# Patient Record
Sex: FEMALE | Race: WHITE | Employment: OTHER | URBAN - METROPOLITAN AREA
[De-identification: names, ages, dates, MRNs, and addresses within clinical notes are randomized per-mention and may not be internally consistent; named-entity substitution may affect disease eponyms.]

---

## 2017-01-03 ENCOUNTER — HOSPITAL ENCOUNTER (OUTPATIENT)
Dept: LAB | Age: 77
Discharge: HOME | End: 2017-01-03
Payer: MEDICARE

## 2017-01-03 ENCOUNTER — OFFICE VISIT (OUTPATIENT)
Dept: FAMILY MEDICINE CLINIC | Age: 77
End: 2017-01-03

## 2017-01-03 VITALS
DIASTOLIC BLOOD PRESSURE: 62 MMHG | RESPIRATION RATE: 18 BRPM | SYSTOLIC BLOOD PRESSURE: 126 MMHG | BODY MASS INDEX: 18.83 KG/M2 | WEIGHT: 113 LBS | TEMPERATURE: 97.5 F | OXYGEN SATURATION: 95 % | HEIGHT: 65 IN | HEART RATE: 58 BPM

## 2017-01-03 DIAGNOSIS — Z23 ENCOUNTER FOR IMMUNIZATION: ICD-10-CM

## 2017-01-03 DIAGNOSIS — R73.01 IMPAIRED FASTING GLUCOSE: Primary | ICD-10-CM

## 2017-01-03 DIAGNOSIS — E78.5 HYPERLIPIDEMIA, UNSPECIFIED HYPERLIPIDEMIA TYPE: ICD-10-CM

## 2017-01-03 DIAGNOSIS — I10 BENIGN ESSENTIAL HYPERTENSION: ICD-10-CM

## 2017-01-03 PROCEDURE — 83036 HEMOGLOBIN GLYCOSYLATED A1C: CPT

## 2017-01-03 PROCEDURE — 36415 COLL VENOUS BLD VENIPUNCTURE: CPT

## 2017-01-03 PROCEDURE — 82947 ASSAY GLUCOSE BLOOD QUANT: CPT

## 2017-01-03 NOTE — PROGRESS NOTES
Chief Complaint   Patient presents with    Hypertension      not fasting ---- follow up    Cholesterol Problem     . 1. Have you been to the ER, urgent care clinic since your last visit? Hospitalized since your last visit? No    2. Have you seen or consulted any other health care providers outside of the [facility] since your last visit? Include any pap smears or colon screening.  No    Stepped on nail on Saturday- 3 days ago - had some work done on her Bathroom and a nail was left-did puncture the foot and was bleeding a moderate amount-states she stepped on it twice

## 2017-01-03 NOTE — PATIENT INSTRUCTIONS
Prediabetes: Care Instructions  Your Care Instructions  Prediabetes is a warning sign that you are at risk for getting type 2 diabetes. It means that your blood sugar is higher than it should be. The food you eat turns into sugar, which your body uses for energy. Normally, an organ called the pancreas makes insulin, which allows the sugar in your blood to get into your body's cells. But when your body can't use insulin the right way, the sugar doesn't move into cells. It stays in your blood instead. This is called insulin resistance. The buildup of sugar in the blood causes prediabetes. The good news is that lifestyle changes may help you get your blood sugar back to normal and help you avoid or delay diabetes. Follow-up care is a key part of your treatment and safety. Be sure to make and go to all appointments, and call your doctor if you are having problems. It's also a good idea to know your test results and keep a list of the medicines you take. How can you care for yourself at home? · Watch your weight. A healthy weight helps your body use insulin properly. · Limit the amount of calories, sweets, and unhealthy fat you eat. Ask your doctor if you should see a dietitian. A registered dietitian can help you create meal plans that fit your lifestyle. · Get at least 30 minutes of exercise on most days of the week. Exercise helps control your blood sugar. It also helps you maintain a healthy weight. Walking is a good choice. You also may want to do other activities, such as running, swimming, cycling, or playing tennis or team sports. · Do not smoke. Smoking can make prediabetes worse. If you need help quitting, talk to your doctor about stop-smoking programs and medicines. These can increase your chances of quitting for good. · If your doctor prescribed medicines, take them exactly as prescribed. Call your doctor if you think you are having a problem with your medicine.  You will get more details on the specific medicines your doctor prescribes. When should you call for help? Watch closely for changes in your health, and be sure to contact your doctor if:  · You have any symptoms of diabetes. These may include:  ¨ Being thirsty more often. ¨ Urinating more. ¨ Being hungrier. ¨ Losing weight. ¨ Being very tired. ¨ Having blurry vision. · You have a wound that will not heal.  · You have an infection that will not go away. · You have problems with your blood pressure. · You want more information about diabetes and how you can keep from getting it. Where can you learn more? Go to http://elizabeth-fab.info/. Enter I222 in the search box to learn more about \"Prediabetes: Care Instructions. \"  Current as of: May 23, 2016  Content Version: 11.1  © 6432-8740 [employer]. Care instructions adapted under license by [employer] (which disclaims liability or warranty for this information). If you have questions about a medical condition or this instruction, always ask your healthcare professional. [address] any warranty or liability for your use of this information.

## 2017-01-03 NOTE — MR AVS SNAPSHOT
Visit Information Date & Time Provider Department Dept. Phone Encounter #  
 1/3/2017 10:00 AM Monica Soares, [address] [phone] [id number] Upcoming Health Maintenance Date Due FOBT Q 1 YEAR AGE 50-75 6/29/2011 MEDICARE YEARLY EXAM 6/25/2017 GLAUCOMA SCREENING Q2Y 3/22/2018 DTaP/Tdap/Td series (2 - Td) 6/24/2026 Allergies as of 1/3/2017  Review Complete On: 1/3/2017 By: Monica Soares MD  
  
 Severity Noted Reaction Type Reactions Sulfa (Sulfonamide Antibiotics)  06/25/2010    Nausea Only Current Immunizations  Reviewed on 1/3/2017 Name Date Influenza High Dose Vaccine PF 1/3/2017 Influenza Vaccine 3/9/2015 Influenza Vaccine Split 12/31/2012 Pneumococcal Polysaccharide (PPSV-23) 3/7/2016 Pneumococcal Vaccine (Unspecified Type) 9/3/2007 TB Skin Test (PPD) Intradermal 7/6/2016 TD Vaccine 6/18/1990 Tdap 6/24/2016 Reviewed by Monica Soares MD on 1/3/2017 at 10:38 AM  
You Were Diagnosed With   
  
 Codes Comments Impaired fasting glucose    -  Primary ICD-10-CM: R73.01 
ICD-9-CM: 790.21 Hyperlipidemia, unspecified hyperlipidemia type     ICD-10-CM: E78.5 ICD-9-CM: 272.4 Benign essential hypertension     ICD-10-CM: I10 
ICD-9-CM: 401.1 Encounter for immunization     ICD-10-CM: I85 ICD-9-CM: V03.89 Vitals BP Pulse Temp Resp Height(growth percentile) Weight(growth percentile) 126/62 (BP 1 Location: Left arm, BP Patient Position: Sitting) (!) 58 97.5 °F (36.4 °C) (Oral) 18 5' 5\" (1.651 m) 113 lb (51.3 kg) LMP SpO2 BMI OB Status Smoking Status (LMP Unknown) 95% 18.8 kg/m2 Postmenopausal Current Every Day Smoker Vitals History BMI and BSA Data Body Mass Index Body Surface Area  
 18.8 kg/m 2 1.53 m 2 Preferred Pharmacy Pharmacy Name Phone  [id number] [address] [phone] Your Updated Medication List  
  
   
This list is accurate as of: 1/3/17 10:54 AM.  Always use your most recent med list.  
  
  
  
  
 aspirin 325 mg tablet Commonly known as:  ASPIRIN Take 325 mg by mouth daily. atorvastatin 10 mg tablet Commonly known as:  LIPITOR  
TAKE 1 TABLET BY MOUTH EVERY DAY  
  
 buprenorphine 5 mcg/hour patch Commonly known as:  BUTRANS  
1 Patch by TransDERmal route every seven (7) days. CALTRATE 600+D PLUS MINERALS 600 mg (1,500 mg)-400 unit Chew Generic drug:  Calcium Carbonate-Vit D3-Min Take  by mouth two (2) times a day. CENTRUM PO Take 1 Tab by mouth daily. FISH OIL PO Take 1 Cap by mouth two (2) times a day. lisinopril 40 mg tablet Commonly known as:  PRINIVIL, ZESTRIL  
TAKE 1 TABLET BY MOUTH EVERY DAY  
  
 meloxicam 15 mg tablet Commonly known as:  MOBIC  
TAKE 1 TABLET BY MOUTH DAILY WITH BREAKFAST  
  
 metoprolol tartrate 50 mg tablet Commonly known as:  LOPRESSOR  
TAKE 1 TABLET BY MOUTH TWICE A DAY PRESERVISION PO Take 1 Tab by mouth two (2) times a day. traMADol 50 mg tablet Commonly known as:  ULTRAM  
Take 50 mg by mouth daily as needed for Pain. Per Neuro Dr Tona Levi  
  
 venlafaxine- mg capsule Commonly known as:  EFFEXOR-XR  
TAKE ONE CAPSULE BY MOUTH EVERY DAY Patient Instructions Prediabetes: Care Instructions Your Care Instructions Prediabetes is a warning sign that you are at risk for getting type 2 diabetes. It means that your blood sugar is higher than it should be. The food you eat turns into sugar, which your body uses for energy. Normally, an organ called the pancreas makes insulin, which allows the sugar in your blood to get into your body's cells. But when your body can't use insulin the right way, the sugar doesn't move into cells.  It stays in your blood instead. This is called insulin resistance. The buildup of sugar in the blood causes prediabetes. The good news is that lifestyle changes may help you get your blood sugar back to normal and help you avoid or delay diabetes. Follow-up care is a key part of your treatment and safety. Be sure to make and go to all appointments, and call your doctor if you are having problems. It's also a good idea to know your test results and keep a list of the medicines you take. How can you care for yourself at home? · Watch your weight. A healthy weight helps your body use insulin properly. · Limit the amount of calories, sweets, and unhealthy fat you eat. Ask your doctor if you should see a dietitian. A registered dietitian can help you create meal plans that fit your lifestyle. · Get at least 30 minutes of exercise on most days of the week. Exercise helps control your blood sugar. It also helps you maintain a healthy weight. Walking is a good choice. You also may want to do other activities, such as running, swimming, cycling, or playing tennis or team sports. · Do not smoke. Smoking can make prediabetes worse. If you need help quitting, talk to your doctor about stop-smoking programs and medicines. These can increase your chances of quitting for good. · If your doctor prescribed medicines, take them exactly as prescribed. Call your doctor if you think you are having a problem with your medicine. You will get more details on the specific medicines your doctor prescribes. When should you call for help? Watch closely for changes in your health, and be sure to contact your doctor if: 
· You have any symptoms of diabetes. These may include: ¨ Being thirsty more often. ¨ Urinating more. ¨ Being hungrier. ¨ Losing weight. ¨ Being very tired. ¨ Having blurry vision. · You have a wound that will not heal. 
· You have an infection that will not go away. · You have problems with your blood pressure. · You want more information about diabetes and how you can keep from getting it. Where can you learn more? Go to http://elizabeth-fab.info/. Enter I222 in the search box to learn more about \"Prediabetes: Care Instructions. \" Current as of: May 23, 2016 Content Version: 11.1 © 7046-8909 [employer]. Care instructions adapted under license by [employer] (which disclaims liability or warranty for this information). If you have questions about a medical condition or this instruction, always ask your healthcare professional. [address] any warranty or liability for your use of this information. Introducing [facility]! Rosalie Romo introduces Slice patient portal. Now you can access parts of your medical record, email your doctor's office, and request medication refills online. 1. In your internet browser, go to [email or web address] 2. Click on the First Time User? Click Here link in the Sign In box. You will see the New Member Sign Up page. 3. Enter your Slice Access Code exactly as it appears below. You will not need to use this code after youve completed the sign-up process. If you do not sign up before the expiration date, you must request a new code. · Slice Access Code: [email or web address] Expires: 3/1/2017 10:14 AM 
 
4. Enter the last four digits of your Social Security Number (xxxx) and Date of Birth (mm/dd/yyyy) as indicated and click Submit. You will be taken to the next sign-up page. 5. Create a MiniBanda.rut ID. This will be your Slice login ID and cannot be changed, so think of one that is secure and easy to remember. 6. Create a Slice password. You can change your password at any time. 7. Enter your Password Reset Question and Answer. This can be used at a later time if you forget your password. 8. Enter your e-mail address.  You will receive e-mail notification when new information is available in Optimal Blue. 9. Click Sign Up. You can now view and download portions of your medical record. 10. Click the Download Summary menu link to download a portable copy of your medical information. If you have questions, please visit the Frequently Asked Questions section of the Optimal Blue website. Remember, Optimal Blue is NOT to be used for urgent needs. For medical emergencies, dial 911. Now available from your iPhone and Android! Please provide this summary of care documentation to your next provider. Your primary care clinician is listed as AXEL WALSH. If you have any questions after today's visit, please call [phone].

## 2017-01-03 NOTE — PROGRESS NOTES
HISTORY OF PRESENT ILLNESS  Lenise Shone is a 78 y.o. female. HPI  6 month follow up prediabetes, HTN, medication check. Not fasting today but all her other labs were normal in 6-2016 (copied below). Her fasting BS that day was 116. Her lipids were excellent. Her BP's have remained good and stable. Generally she has been getting along well and feeling well. Has not had flu shot yet this year. Was wondering about last tetanus shot bc she stepped on a nail in her house 3 days ago. It bled but stopped w/ applying pressure. She cleaned it and has been wearing a bandaid ever since. Heeling up well. No pain. No oozing. No signs of infection. I checked chart and she did have a Tdap at [facility] in 6-2016. Review of Systems   Constitutional: Negative. Negative for chills and fever. Respiratory: Negative. Doing well, sees Pulm Dr Ute Nichols q 6months   Cardiovascular: Negative. Gastrointestinal: Negative. Genitourinary: Negative. Musculoskeletal: Negative for myalgias. Neurological: Negative for dizziness. Endo/Heme/Allergies: Negative. Problem List  Date Reviewed: 1/3/2017          Codes Class Noted    ACP (advance care planning) ICD-10-CM: Z71.89  ICD-9-CM: V65.49  6/24/2016    Overview Signed 6/24/2016  8:33 AM by Madeline Delaney MD     Initial ACP discussion.  Pt states she has an AMD               Benign essential hypertension ICD-10-CM: I10  ICD-9-CM: 401.1  6/24/2016    Overview Signed 6/24/2016  8:34 AM by Madeline Delaney MD     Normal 2D echo 1996  Holter in 2008, occasional PAC's/PVC's             Chronic [facility] pulmonary disease ([facility] ICD-10-CM: J44.9  ICD-9-CM: 496  6/24/2016    Overview Signed 6/24/2016  8:42 PM by Madeline Delaney MD     On CXR 6-2016             Mammogram declined ICD-10-CM: Z53.20  ICD-9-CM: V64.2  12/31/2012    Overview Signed 12/31/2012  9:21 AM by Madeline Delaney MD     Pt refuses             FHx: AAA ICD-10-CM: [id number]  ICD-9-CM: Sharee Danielson  12/31/2012    Overview Signed 12/31/2012  2:19 PM by Juan Ochoa MD     Patient had AAA US screen 9/2007, no aneurysm but mild atherosclerotic plaque             Cervical myelopathy ICD-10-CM: G95.9  ICD-9-CM: 721.1  3/26/2012    Overview Signed 3/26/2012 11:50 AM by Juan Ochoa MD     Neuro Dr Moses Lewis 11/2011             B Foot Neuropathy  ICD-10-CM: G57.90  ICD-9-CM: 355.8  9/22/2011    Overview Signed 9/22/2011  2:15 PM by Juan Ochoa MD     Injections per Podiatry Dr Ashley Toney ~ 2008; orthotics             Idiopathic peripheral neuropathy ICD-10-CM: G60.9  ICD-9-CM: 356.9  9/22/2011    Overview Signed 9/22/2011  2:19 PM by Juan Ochoa MD     Neuro Eval Dr Anat Cabral, [facility] Dr Lidia Anne             H/O Gait Ataxia ICD-10-CM: R27.0  ICD-9-CM: 781.3  9/22/2011    Overview Signed 9/22/2011  2:20 PM by Juan Ochoa MD     S/P Neuro eval; prob r/t etoh and peripheral neuropathy             Tobacco abuse ICD-10-CM: Z72.0  ICD-9-CM: 305.1  9/22/2011        ?  of History of heavy alcohol use ICD-10-CM: P36.125  ICD-9-CM: 305.03  9/22/2011    Overview Signed 9/22/2011  2:27 PM by Juan Ochoa MD     ~8650'H             H/O Idiopathic Microscopic Hematuria ICD-10-CM: R31.29  ICD-9-CM: 599.72  9/22/2011    Overview Signed 9/22/2011  3:11 PM by Juan Ochoa MD     Cystoscopy and eval per Urology Dr Laurita Shone 2008             H/O Left Breast Calcifications ICD-10-CM: R92.1  ICD-9-CM: 793.89  9/22/2011    Overview Signed 9/22/2011  3:14 PM by Juan Ochoa MD     1997, declined bx per Dr George Massey             S/P Left Humeral Fracture 2005 ICD-10-CM: L68.440F  ICD-9-CM: 812.20  9/22/2011    Overview Signed 9/22/2011  3:15 PM by MD Allan Billingsley, reset; Dr Antoine Stiles             DDD (degenerative disc disease), cervical ICD-10-CM: M50.30  ICD-9-CM: 722.4  9/22/2011        DDD (degenerative disc disease), lumbar ICD-10-CM: M51.36  ICD-9-CM: 722.52  9/22/2011    Overview Signed 9/22/2011  3:15 PM by Peter Rasheed MD     PT at [facility] 2009             Left sided sciatica ICD-10-CM: M54.32  ICD-9-CM: 724.3  9/22/2011    Overview Signed 9/22/2011  3:16 PM by Peter Rasheed MD     PT             Left Cervical Radiculopathy, Cervical DDD, Fusion and Facet Arthropathy ICD-10-CM: M54.12  ICD-9-CM: 723.4  9/22/2011    Overview Signed 9/22/2011  3:17 PM by Peter Rasheed MD     PT, 2009             Hyperlipidemia ICD-10-CM: E78.5  ICD-9-CM: 272.4  6/25/2010        Postmenopause, LMP 52yo, No HRT ICD-10-CM: Z78.0  ICD-9-CM: V49.81  6/25/2010        Palpitations ICD-10-CM: R00.2  ICD-9-CM: 785.1  3/16/2010    Overview Signed 3/16/2010  2:05 PM by Miesha Howard     ER [id number]             Bilateral cataracts ICD-10-CM: H26.9  ICD-9-CM: 366.9  3/16/2010        Macular degeneration ICD-10-CM: H35.30  ICD-9-CM: 362.50  3/16/2010    Overview Signed 9/22/2011  2:01 PM by MD Dr John Talavera             Right hip pain ICD-10-CM: M25.551  ICD-9-CM: 719.45  3/16/2010    Overview Addendum 9/22/2011  3:12 PM by Peter Rasheed MD     Sacroiliac pain; Ortho Dr Kenan Gay             OA (osteoarthritis) ICD-10-CM: M19.90  ICD-9-CM: 715.90  3/16/2010    Overview Addendum 9/22/2011  3:12 PM by Peter Rasheed MD     Knee/hip; Dr Lucio Cedillo (PMR)             H/O B Lower Extremity Pain & Weakness, R>L ICD-10-CM: M79.606  ICD-9-CM: 729.5  3/16/2010    Overview Addendum 9/22/2011  3:13 PM by Peter Rasheed MD     PMR, Dr Lucio Cedillo and Neuro eval                 Past Surgical History   Procedure Laterality Date    Hx hip replacement  4/2010     left hip; Dr Ramandeep Thompson Hx orthopaedic  9/2005     no surgery, but had left shoulder/humeral fx after fall-    Hx gyn  age 25     EAB    Hx colonoscopy  ~2009       Current Outpatient Prescriptions   Medication Sig    atorvastatin (LIPITOR) 10 mg tablet TAKE 1 TABLET BY MOUTH EVERY DAY    lisinopril (PRINIVIL, ZESTRIL) 40 mg tablet TAKE 1 TABLET BY MOUTH EVERY DAY    metoprolol tartrate (LOPRESSOR) 50 mg tablet TAKE 1 TABLET BY MOUTH TWICE A DAY    buprenorphine (BUTRANS) 5 mcg/hour patch 1 Patch by TransDERmal route every seven (7) days.  meloxicam (MOBIC) 15 mg tablet TAKE 1 TABLET BY MOUTH DAILY WITH BREAKFAST    traMADol (ULTRAM) 50 mg tablet Take 50 mg by mouth daily as needed for Pain. Per Neuro Dr Shante Sawyer venlafaxine-[facility] 150 mg capsule TAKE ONE CAPSULE BY MOUTH EVERY DAY    DOCOSAHEXANOIC ACID/EPA (FISH OIL PO) Take 1 Cap by mouth two (2) times a day.  Calcium Carbonate-Vit D3-Min (CALTRATE 600+D PLUS MINERALS) 600 mg (1,500 mg)-400 unit Chew Take  by mouth two (2) times a day.  VIT C/DL-E AC/LUT/COPPER/ZNOX (PRESERVISION PO) Take 1 Tab by mouth two (2) times a day.  aspirin (ASPIRIN) 325 mg tablet Take 325 mg by mouth daily.  MULTIVITS W-FE,OTHER MIN (CENTRUM PO) Take 1 Tab by mouth daily. No current facility-administered medications for this visit.       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      Retired     Social History Main Topics    Smoking status: Current Every Day Smoker     Packs/day: 1.00     Years: 40.00     Types: Cigarettes    Smokeless tobacco: Never Used    Alcohol use 0.0 oz/week     0 Standard drinks or equivalent per week      Comment: 1 glass of red wine qhs    Drug use: No    Sexual activity: No     Other Topics Concern     Service No    Blood Transfusions No    Caffeine Concern No     2-3 cups of decaff coffee a day    Occupational Exposure No    Hobby Hazards No    Sleep Concern No    Stress Concern No    Weight Concern No     stable; her usual wts in the 110's-120's     Special Diet No     pretty well balanced diet    Back Care No    Exercise No     stays active, yardwork, gardening    Bike Helmet No    Seat Belt Yes    Self-Exams Yes     Social History Narrative     since 3/2011,  was Bertha Serra,  of lung cancer. Lives at home alone now. Still does all own ADL's, drives, no asst devices to walk. Immunization History   Administered Date(s) Administered    Influenza Vaccine 2015    Influenza Vaccine Split 2012    Pneumococcal Polysaccharide (PPSV-23) 2016    Pneumococcal Vaccine (Unspecified Type) 2007    TB Skin Test (PPD) Intradermal 2016    TD Vaccine 1990    Tdap 2016       Family History   Problem Relation Age of Onset    Cancer Father      skin    Heart Attack Father       in his late [de-identified]    Hypertension Father    [facility] Arthritis-osteo Father     Other Father      AAA; pt had negative screen     Stroke Mother       in her late 66's    Hypertension Mother    [facility] Arthritis-osteo Mother     Diabetes Mother     Cancer Sister      breast and lung    Hypertension Sister     Dementia Sister      Alzheimer[place]     Visit Vitals    /62 (BP 1 Location: Left arm, BP Patient Position: Sitting)    Pulse (!) 58    Temp 97.5 °F (36.4 °C) (Oral)    Resp 18    Ht 5' 5\" (1.651 m)    Wt 113 lb (51.3 kg)    LMP  (LMP Unknown)    SpO2 95%    BMI 18.8 kg/m2       Physical Exam   Constitutional: No distress. Cardiovascular: Normal rate, regular rhythm and normal heart sounds. No murmur heard. Pulmonary/Chest: Effort normal and breath sounds normal. No respiratory distress. Musculoskeletal: She exhibits no edema or tenderness. Skin:   Tiny puncture wound plantar aspect left foot laterally. Skin clean, dry. No oozing. No signs of infection. Non tender.     Vitals reviewed. Component      Latest Ref Rng & Units 6/24/2016 6/24/2016 6/24/2016 6/24/2016           9:04 AM  9:04 AM  9:04 AM  9:04 AM   Glucose      65 - 99 mg/dL  116 (H)     BUN      8 - 27 mg/dL  17     Creatinine      0.57 - 1.00 mg/dL  0.77     GFR est non-AA      >59 mL/min/1.73  76     GFR est AA      >59 mL/min/1.73  88     BUN/Creatinine ratio      11 - 26  22     Sodium      134 - 144 mmol/L  143     Potassium      3.5 - 5.2 mmol/L  4.4     Chloride      97 - 108 mmol/L  101     CO2      18 - 29 mmol/L  27     Calcium      8.7 - 10.3 mg/dL  9.9     Protein, total      6.0 - 8.5 g/dL  7.1     Albumin      3.5 - 4.8 g/dL  4.4     GLOBULIN, TOTAL      1.5 - 4.5 g/dL  2.7     A-G Ratio      1.1 - 2.5  1.6     Bilirubin, total      0.0 - 1.2 mg/dL  0.5     Alk. phosphatase      39 - 117 IU/L  96     AST      0 - 40 IU/L  29     ALT      0 - 32 IU/L  20     WBC      3.4 - 10.8 x10E3/uL    7.3   RBC      3.77 - 5.28 x10E6/uL    4.37   HGB      11.1 - 15.9 g/dL    13.7   HCT      34.0 - 46.6 %    41.1   MCV      79 - 97 fL    94   MCH      26.6 - 33.0 pg    31.4   MCHC      31.5 - 35.7 g/dL    33.3   RDW      12.3 - 15.4 %    13.5   PLATELET      586 - 879 x10E3/uL    201   Cholesterol, total      100 - 199 mg/dL   168    Triglyceride      0 - 149 mg/dL   106    HDL Cholesterol      >39 mg/dL   69    VLDL, calculated      5 - 40 mg/dL   21    LDL, calculated      0 - 99 mg/dL   78    TSH      0.450 - 4.500 uIU/mL 2.090          ASSESSMENT and PLAN    ICD-10-CM ICD-9-CM    1. Impaired fasting glucose R73.01 790.21 HEMOGLOBIN A1C WITH EAG      GLUCOSE, RANDOM   2. Hyperlipidemia, controlled, stable on Lipitor E78.5 272.4    3. Benign essential hypertension, controlled, stable I10 401.1      Fasting lab results from 6-2016 and schedule of future lab studies reviewed with patient  Non fasting today.  Only checking her BS and HgBA1c today  Reviewed diet, nutrition, exercise activity  Cardiovascular risk and specific lipid/LDL/BS/HgBA1c/BP goals reviewed  Reviewed medications and side effects   Doing well on current regimen  Further follow up & other recommendations pending review of labs as well  If all remains good and stable, RTC for routine fasting follow up 6months, sooner prn

## 2017-01-04 LAB
EST. AVERAGE GLUCOSE BLD GHB EST-MCNC: 120 MG/DL
GLUCOSE SERPL-MCNC: 95 MG/DL (ref 65–99)
HBA1C MFR BLD: 5.8 % (ref 4.8–5.6)

## 2017-01-13 NOTE — PROGRESS NOTES
Non fasting BS and HgBA1c overall good. No changes at this time. Fasting follow up 6months. Set now, morning appt, advise to fast next time.

## 2017-01-13 NOTE — PROGRESS NOTES
Verified   Informed pt of recent lab results and set up appointment for 2017 for 10am (6m fasting follow up visit)

## 2017-02-08 ENCOUNTER — TELEPHONE (OUTPATIENT)
Dept: FAMILY MEDICINE CLINIC | Age: 77
End: 2017-02-08

## 2017-02-08 NOTE — TELEPHONE ENCOUNTER
PC states she is getting auto calls for bone density. Asked her to disregard those calls, we will discuss at next OV.

## 2017-03-09 ENCOUNTER — HOSPITAL ENCOUNTER (OUTPATIENT)
Dept: VASCULAR SURGERY | Age: 77
Discharge: HOME | End: 2017-03-09
Attending: PSYCHIATRY & NEUROLOGY
Payer: MEDICARE

## 2017-03-09 DIAGNOSIS — M79.89 SWELLING OF LIMB: ICD-10-CM

## 2017-03-09 PROCEDURE — 93970 EXTREMITY STUDY: CPT

## 2017-03-09 NOTE — PROCEDURES
[facility]  *** FINAL REPORT ***    Name: Concepcion Piña  MRN: [id number]    Outpatient  : 01 Mar 1940  HIS Order #: [id number]  [id number] [facility] Visit #: [id number]  Date: 09 Mar 2017    TYPE OF TEST: Peripheral Venous Testing    REASON FOR TEST  Limb swelling    Right Leg:-  Deep venous thrombosis:           No  Superficial venous thrombosis:    No  Deep venous insufficiency:        Not examined  Superficial venous insufficiency: Not examined    Left Leg:-  Deep venous thrombosis:           No  Superficial venous thrombosis:    No  Deep venous insufficiency:        Not examined  Superficial venous insufficiency: Not examined      INTERPRETATION/FINDINGS  PROCEDURE:  Color duplex ultrasound imaging of lower extremity veins. FINDINGS:       Right: The common femoral, deep femoral, femoral, popliteal,  posterior tibial, peroneal, and great saphenous are patent and without   evidence of thrombus;  each is fully compressible and there is no  narrowing of the flow channel on color Doppler imaging. Phasic flow  is observed in the common femoral vein. Left:   The common femoral, deep femoral, femoral, popliteal,  posterior tibial, peroneal, and great saphenous are patent and without   evidence of thrombus;  each is fully compressible and there is no  narrowing of the flow channel on color Doppler imaging. Phasic flow  is observed in the common femoral vein. IMPRESSION:  No evidence of right or left lower extremity vein  thrombosis. ADDITIONAL COMMENTS    I have personally reviewed the data relevant to the interpretation of  this  study. TECHNOLOGIST: Ariadna Clark.  Perez Paris  Signed: 2017 10:10 AM    PHYSICIAN: Chris Rubio MD  Signed: 03/10/2017 06:35 AM

## 2017-06-12 ENCOUNTER — HOSPITAL ENCOUNTER (OUTPATIENT)
Dept: CT IMAGING | Age: 77
Discharge: HOME | End: 2017-06-12
Attending: INTERNAL MEDICINE
Payer: MEDICARE

## 2017-06-12 DIAGNOSIS — R93.89 ABNORMAL CHEST CT: ICD-10-CM

## 2017-06-12 PROCEDURE — 71250 CT THORAX DX C-: CPT

## 2017-07-03 ENCOUNTER — HOSPITAL ENCOUNTER (OUTPATIENT)
Dept: PET IMAGING | Age: 77
Discharge: HOME | End: 2017-07-03
Attending: INTERNAL MEDICINE
Payer: MEDICARE

## 2017-07-03 VITALS — BODY MASS INDEX: 17.04 KG/M2 | HEIGHT: 66 IN | WEIGHT: 106 LBS

## 2017-07-03 DIAGNOSIS — R91.1 LUNG NODULE: ICD-10-CM

## 2017-07-03 PROCEDURE — 78815 PET IMAGE W/CT SKULL-THIGH: CPT

## 2017-07-03 RX ORDER — SODIUM CHLORIDE 0.9 % (FLUSH) 0.9 %
10 SYRINGE (ML) INJECTION
Status: COMPLETED | OUTPATIENT
Start: 2017-07-03 | End: 2017-07-03

## 2017-07-03 RX ADMIN — Medication 10 ML: at 10:54

## 2017-07-14 ENCOUNTER — HOSPITAL ENCOUNTER (OUTPATIENT)
Dept: LAB | Age: 77
Discharge: HOME | End: 2017-07-14
Payer: MEDICARE

## 2017-07-14 ENCOUNTER — OFFICE VISIT (OUTPATIENT)
Dept: FAMILY MEDICINE CLINIC | Age: 77
End: 2017-07-14

## 2017-07-14 VITALS
OXYGEN SATURATION: 96 % | DIASTOLIC BLOOD PRESSURE: 68 MMHG | TEMPERATURE: 97.9 F | SYSTOLIC BLOOD PRESSURE: 128 MMHG | HEIGHT: 66 IN | RESPIRATION RATE: 14 BRPM | BODY MASS INDEX: 16.49 KG/M2 | HEART RATE: 76 BPM | WEIGHT: 102.6 LBS

## 2017-07-14 DIAGNOSIS — I10 BENIGN ESSENTIAL HYPERTENSION: ICD-10-CM

## 2017-07-14 DIAGNOSIS — Z00.00 ROUTINE GENERAL MEDICAL EXAMINATION AT A HEALTH CARE FACILITY: Primary | ICD-10-CM

## 2017-07-14 DIAGNOSIS — E78.5 HYPERLIPIDEMIA, UNSPECIFIED HYPERLIPIDEMIA TYPE: ICD-10-CM

## 2017-07-14 PROBLEM — R26.81 GAIT INSTABILITY: Status: ACTIVE | Noted: 2017-07-14

## 2017-07-14 PROBLEM — R91.8 MULTIPLE PULMONARY NODULES: Status: ACTIVE | Noted: 2017-07-14

## 2017-07-14 PROCEDURE — 84443 ASSAY THYROID STIM HORMONE: CPT

## 2017-07-14 PROCEDURE — 81001 URINALYSIS AUTO W/SCOPE: CPT

## 2017-07-14 PROCEDURE — 85025 COMPLETE CBC W/AUTO DIFF WBC: CPT

## 2017-07-14 PROCEDURE — 80061 LIPID PANEL: CPT

## 2017-07-14 NOTE — PATIENT INSTRUCTIONS
High-Calorie and High-Protein Diet: Care Instructions  Your Care Instructions  A high-calorie, high-protein diet gives you more energy and extra nutrition to help your body heal. Your doctor and dietitian can help you design a diet based on your health and what you prefer to eat. Always talk with your doctor or dietitian before you make changes in your diet. Follow-up care is a key part of your treatment and safety. Be sure to make and go to all appointments, and call your doctor if you are having problems. Its also a good idea to know your test results and keep a list of the medicines you take. How can you care for yourself at home? · Eat three meals a day, plus snacks in between and at bedtime. · Include favorite foods in your meals. This will help make meals more pleasant. · Drink your beverage at the end of the meal, because drinking before or during the meal can fill you up. · Eat high-protein foods, such as:  ¨ Meat, fish, and poultry. ¨ Milk and milk products. Add powdered milk to other foods (such as pudding or soups) to boost the protein. ¨ Eggs. ¨ Cooked dried beans and peas. ¨ Peanut butter, nuts, and seeds. ¨ Tofu. ¨ Cheeses. ¨ Protein bars. · Eat high-calorie foods, such as:  ¨ Butter, honey, and brown sugar, added to foods to make them taste better. ¨ Oils, sauces, and gravies. ¨ Peanut butter. ¨ Whole milk, yogurt, mayonnaise, and sour cream.  ¨ Granola cereal with fruit and granola bars. ¨ Muffins, pancakes, waffles, and other breads. ¨ Milkshakes, puddings, and custard. · Try high-energy drinks, such as Ensure, Boost, or instant breakfast drinks, if you have trouble eating solid foods. They will give you both calories and protein. Soups and smoothies also are good sources of nutrition. · Keep snacks around that are easy to eat, such as pudding, energy bars, ice cream, and flavored ice pops. · If you can, take a walk before you eat, to make you hungrier.   · Do not waste energy eating foods that do not give you much nutrition, such as potato chips, candy bars, and soft drinks. · Drink plenty of fluids. If you have kidney, heart, or liver disease and have to limit fluids, talk with your doctor before you increase the amount of fluids you drink. Where can you learn more? Go to http://elizabeth-fab.info/. Enter U780 in the search box to learn more about \"High-Calorie and High-Protein Diet: Care Instructions. \"  Current as of: July 26, 2016  Content Version: 11.3  © 6406-3927 [employer]. Care instructions adapted under license by [employer] (which disclaims liability or warranty for this information). If you have questions about a medical condition or this instruction, always ask your healthcare professional. [address] any warranty or liability for your use of this information. Advance Directives: Care Instructions  Your Care Instructions  An advance directive is a legal way to state your wishes at the end of your life. It tells your family and your doctor what to do if you can no longer say what you want. There are two main types of advance directives. You can change them any time that your wishes change. · A living will tells your family and your doctor your wishes about life support and other treatment. · A durable power of  for health care lets you name a person to make treatment decisions for you when you can't speak for yourself. This person is called a health care agent. If you do not have an advance directive, decisions about your medical care may be made by a doctor or a  who doesn't know you. It may help to think of an advance directive as a gift to the people who care for you. If you have one, they won't have to make tough decisions by themselves. Follow-up care is a key part of your treatment and safety.  Be sure to make and go to all appointments, and call your doctor if you are having problems. It's also a good idea to know your test results and keep a list of the medicines you take. How can you care for yourself at home? · Discuss your wishes with your loved ones and your doctor. This way, there are no surprises. · Many states have a unique form. Or you might use a universal form that has been approved by many states. This kind of form can sometimes be completed and stored online. Your electronic copy will then be available wherever you have a connection to the Internet. In most cases, doctors will respect your wishes even if you have a form from a different state. · You don't need a  to do an advance directive. But you may want to get legal advice. · Think about these questions when you prepare an advance directive:  ¨ Who do you want to make decisions about your medical care if you are not able to? Many people choose a family member or close friend. ¨ Do you know enough about life support methods that might be used? If not, talk to your doctor so you understand. ¨ What are you most afraid of that might happen? You might be afraid of having pain, losing your independence, or being kept alive by machines. ¨ Where would you prefer to die? Choices include your home, a hospital, or a nursing home. ¨ Would you like to have information about hospice care to support you and your family? ¨ Do you want to donate organs when you die? ¨ Do you want certain Shinto practices performed before you die? If so, put your wishes in the advance directive. · Read your advance directive every year, and make changes as needed. When should you call for help? Be sure to contact your doctor if you have any questions. Where can you learn more? Go to http://elizabeth-fab.info/. Enter R264 in the search box to learn more about \"Advance Directives: Care Instructions. \"  Current as of: November 17, 2016  Content Version: 11.3  © 1291-2542 "[employer] [facility].  Care instructions adapted under license by [employer] (which disclaims liability or warranty for this information). If you have questions about a medical condition or this instruction, always ask your healthcare professional. [address] any warranty or liability for your use of this information. Schedule of Personalized Health Plan  (Provide Copy to Patient)  The best way to stay healthy is to live a healthy lifestyle. A healthy lifestyle includes regular exercise, eating a well-balanced diet, keeping a healthy weight and not smoking. Regular physical exams and screening tests are another important way to take care of yourself. Preventive exams provided by health care providers can find health problems early when treatment works best and can keep you from getting certain diseases or illnesses. Preventive services include exams, lab tests, screenings, shots, monitoring and information to help you take care of your own health. All people over 65 should have a pneumonia shot. Pneumonia shots are usually only needed once in a lifetime unless your doctor decides differently. All people over 65 should have a yearly flu shot. People over 65 are at medium to high risk for Hepatitis B. Three shots are needed for complete protection. In addition to your physical exam, some screening tests are recommended:    Bone mass measurement (dexa scan) is recommended every two years  Diabetes Mellitus screening is recommended every year. Glaucoma is an eye disease caused by high pressure in the eye. An eye exam is recommended every year. Cardiovascular screening tests that check your cholesterol and other blood fat (lipid) levels are recommended every five years. Colorectal Cancer screening tests help to find pre-cancerous polyps (growths in the colon) so they can be removed before they turn into cancer.   Tests ordered for screening depend on your personal and family history risk factors.     Screening for Breast Cancer is recommended yearly with a mammogram.    Screening for Cervical Cancer is recommended every two years (annually for certain risk factors, such as previous history of STD or abnormal PAP in past 7 years), with a Pelvic Exam with PAP    Here is a list of your current Health Maintenance items with a due date:  Health Maintenance   Topic Date Due    INFLUENZA AGE 9 TO ADULT  08/01/2017    GLAUCOMA SCREENING Q2Y  03/22/2018    MEDICARE YEARLY EXAM  07/15/2018    DTaP/Tdap/Td series (2 - Td) 06/24/2026    OSTEOPOROSIS SCREENING (DEXA)  Addressed    ZOSTER VACCINE AGE 60>  Addressed    Pneumococcal 65+ Low/Medium Risk  Completed

## 2017-07-14 NOTE — PROGRESS NOTES
HISTORY OF PRESENT ILLNESS  Chay Olson is a 78 y.o. female. HPI  Fasting follow up hypercholesterolemia, hypertension, labs and medication check. Doing well on current medication regimen. Neuro has her on regimen for neuropathy. She seldom takes the Mobic now a days. Feels the buprenorphine he is perscribing is helping and she reserves Tramadol for nighttime on occasion. BP's good and stable. I notice her wt has been trending down. She admits her appetite is not what it used to be but she still tries to eat what she likes. But she finds she is even getting pickier w/ time. She is also being followed closely by Pulm Dr Dory Morrison, see details below. She is scheduled for follow up chest CT scan and bronchoscopy on 7-25-17. She denies any CP, increased cough, wheeze or SOB. No fevers, chills, sweats. Review of Systems   Constitutional: Negative for chills, diaphoresis and fever. Respiratory: Negative for shortness of breath and wheezing. Cardiovascular: Negative for chest pain and palpitations. Gastrointestinal: Negative for abdominal pain, blood in stool, constipation, diarrhea, nausea and vomiting. Musculoskeletal: Negative for myalgias. Neurological: Negative for headaches. Psychiatric/Behavioral: Negative.       Problem List  Date Reviewed: 7/14/2017          Codes Class Noted    Gait instability ICD-10-CM: R26.81  ICD-9-CM: 781.2  7/14/2017    Overview Signed 7/14/2017 10:34 AM by Gato Tabares MD     Neuro referred to PT at [facility] x 2 months Spring 2017             Multiple pulmonary nodules and cavitary lesions ICD-10-CM: R91.8  ICD-9-CM: 793.19  7/14/2017    Overview Signed 7/14/2017  3:42 PM by Gato Tabares MD     2016: Sourav Sarmiento Marker: CT/Pet scan: Inflammatory vs atypical infectious vs metastatic dz             ACP (advance care planning) ICD-10-CM: Z71.89  ICD-9-CM: V65.49  6/24/2016    Overview Addendum 7/14/2017  3:37 PM by Gato Tabares MD     Initial ACP discussion 2016, pt states she thinks she has an AMD; Honoring Choices folder given 7-2017               Benign essential hypertension ICD-10-CM: I10  ICD-9-CM: 401.1  6/24/2016    Overview Signed 6/24/2016  8:34 AM by Frank López MD     Normal 2D echo 1996  Holter in 2008, occasional PAC's/PVC's             Chronic obstructive pulmonary disease ([facility] [address] ICD-10-CM: J44.9  ICD-9-CM: 496  6/24/2016    Overview Addendum 7/14/2017  3:41 PM by Frank López MD     On CXR 6-2016; Pulm Dr Kaylee Spaulding declined ICD-10-CM: Z53.20  ICD-9-CM: V64.2  12/31/2012    Overview Signed 12/31/2012  9:21 AM by Frank López MD     Pt refuses             FHx: AAA ICD-10-CM: Adela Child  ICD-9-CM: Adela Child  12/31/2012    Overview Signed 12/31/2012  2:19 PM by Frank López MD     Patient had AAA US screen 9/2007, no aneurysm but mild atherosclerotic plaque             Cervical myelopathy ([facility] [address] ICD-10-CM: G95.9  ICD-9-CM: 721.1  3/26/2012    Overview Signed 3/26/2012 11:50 AM by Frank López MD     Neuro Dr Rochelle Olivier 11/2011             B Foot Neuropathy  ICD-10-CM: G57.90  ICD-9-CM: 355.8  9/22/2011    Overview Signed 9/22/2011  2:15 PM by Frank López MD     Injections per Podiatry Dr Brigido Ross ~ 2008; orthotics             Idiopathic peripheral neuropathy ICD-10-CM: G60.9  ICD-9-CM: 356.9  9/22/2011    Overview Signed 9/22/2011  2:19 PM by Frank López MD     Neuro Eval Dr Ana Rosa Rosario, [facility] Dr Mansfield Simmonds             H/O Gait Ataxia ICD-10-CM: R27.0  ICD-9-CM: 781.3  9/22/2011    Overview Signed 9/22/2011  2:20 PM by Frank López MD     S/P Neuro eval; prob r/t etoh and peripheral neuropathy             Tobacco abuse ICD-10-CM: Z72.0  ICD-9-CM: 305.1  9/22/2011        ?  of History of heavy alcohol use ICD-10-CM: Y86.811  ICD-9-CM: 305.03  9/22/2011    Overview Signed 9/22/2011  2:27 PM by Frank López MD     ~2848'X H/O Idiopathic Microscopic Hematuria ICD-10-CM: R31.29  ICD-9-CM: 599.72  9/22/2011    Overview Signed 9/22/2011  3:11 PM by Ann Barahona MD     Cystoscopy and eval per Urology Dr Gloria Painting 2008             H/O Left Breast Calcifications ICD-10-CM: R92.1  ICD-9-CM: 793.89  9/22/2011    Overview Signed 9/22/2011  3:14 PM by Ann Barahona MD     1997, declined bx per Dr Genet Dennis             S/P Left Humeral Fracture 2005 ICD-10-CM: X18.064C  ICD-9-CM: 812.20  9/22/2011    Overview Signed 9/22/2011  3:15 PM by MD Alexia Neff, reset; Amber Coreas, Dr Reyes Comment             DDD (degenerative disc disease), cervical ICD-10-CM: M50.30  ICD-9-CM: 722.4  9/22/2011        DDD (degenerative disc disease), lumbar ICD-10-CM: M51.36  ICD-9-CM: 722.52  9/22/2011    Overview Signed 9/22/2011  3:15 PM by Ann Barahona MD     PT at [address] 2009             Left sided sciatica ICD-10-CM: M54.32  ICD-9-CM: 724.3  9/22/2011    Overview Signed 9/22/2011  3:16 PM by Ann Barahona MD     PT             Left Cervical Radiculopathy, Cervical DDD, Fusion and Facet Arthropathy ICD-10-CM: M54.12  ICD-9-CM: 723.4  9/22/2011    Overview Signed 9/22/2011  3:17 PM by Ann Barahona MD     PT, 2009             Hyperlipidemia ICD-10-CM: E78.5  ICD-9-CM: 272.4  6/25/2010        Postmenopause, LMP 54yo, No HRT ICD-10-CM: Z78.0  ICD-9-CM: V49.81  6/25/2010        Palpitations ICD-10-CM: R00.2  ICD-9-CM: 785.1  3/16/2010    Overview Signed 3/16/2010  2:05 PM by Jami Rizo     ER [phone]             Bilateral cataracts ICD-10-CM: H26.9  ICD-9-CM: 366.9  3/16/2010        Macular degeneration ICD-10-CM: H35.30  ICD-9-CM: 362.50  3/16/2010    Overview Signed 9/22/2011  2:01 PM by MD Dr Anna Neff             Right hip pain ICD-10-CM: M25.551  ICD-9-CM: 719.45  3/16/2010    Overview Addendum 9/22/2011  3:12 PM by Ann Barahona MD     Sacroiliac pain; Ortho  Davin Chambers             OA (osteoarthritis) ICD-10-CM: M19.90  ICD-9-CM: 715.90  3/16/2010    Overview Addendum 2011  3:12 PM by Pankaj Wilkins MD     Knee/hip; Dr Tran Malhotra (PMR)             H/O B Lower Extremity Pain & Weakness, R>L ICD-10-CM: M79.606  ICD-9-CM: 729.5  3/16/2010    Overview Addendum 2011  3:13 PM by Pankaj Wilkins MD     PMR, Dr Tran Malhotra and Neuro eval                 Past Surgical History:   Procedure Laterality Date    HX CATARACT REMOVAL Bilateral     HX COLONOSCOPY  ~    HX GYN  age 25    EAB   Keary Roup HX HIP REPLACEMENT  2010    left hip; Dr Manjula Palomino [address]  2005    no surgery, but had left shoulder/humeral fx after fall-     OB History      Para Term  AB Living    1 0   1     SAB TAB Ectopic Molar Multiple Live Births     1            Obstetric Comments    EAB x 1 age 19yo        Current Outpatient Prescriptions   Medication Sig    meloxicam (MOBIC) 15 mg tablet Take 1 Tab by mouth daily as needed (for arthritis pain). Take with food    metoprolol tartrate (LOPRESSOR) 50 mg tablet TAKE 1 TABLET BY MOUTH TWICE A DAY    atorvastatin (LIPITOR) 10 mg tablet TAKE 1 TABLET BY MOUTH EVERY DAY    lisinopril (PRINIVIL, ZESTRIL) 40 mg tablet TAKE 1 TABLET BY MOUTH EVERY DAY    buprenorphine (BUTRANS) 5 mcg/hour patch 1 Patch by TransDERmal route every seven (7) days.  traMADol (ULTRAM) 50 mg tablet Take 50 mg by mouth daily as needed for Pain. Per Neuro Dr Jeni Jin venlafaxine-SR [facility] 150 mg capsule TAKE ONE CAPSULE BY MOUTH EVERY DAY    DOCOSAHEXANOIC ACID/EPA (FISH OIL PO) Take 1 Cap by mouth two (2) times a day.  Calcium Carbonate-Vit D3-Min (CALTRATE 600+D PLUS MINERALS) 600 mg (1,500 mg)-400 unit Chew Take  by mouth two (2) times a day.  VIT C/DL-E AC/LUT/COPPER/ZNOX (PRESERVISION PO) Take 1 Tab by mouth two (2) times a day.  aspirin (ASPIRIN) 325 mg tablet Take 325 mg by mouth daily.     MULTIVITS W-FE,OTHER MIN (CENTRUM PO) Take 1 Tab by mouth daily. No current facility-administered medications for this visit. Allergies   Allergen Reactions    Sulfa (Sulfonamide Antibiotics) Nausea Only     Social History     Social History    Marital status:      Spouse name: N/A    Number of children: 0    Years of education: N/A     Occupational History    Real Estate, fully retired as of 2012      Retired     Social History Main Topics    Smoking status: Current Every Day Smoker     Packs/day: 1.00     Years: 40.00     Types: Cigarettes    Smokeless tobacco: Never Used    Alcohol use 0.0 oz/week     0 Standard drinks or equivalent per week      Comment: 1 glass of red wine qhs    Drug use: No    Sexual activity: No     Other Topics Concern     Service No    Blood Transfusions No    Caffeine Concern No     2-3 cups of decaff coffee a day    Occupational Exposure No    Hobby Hazards No    Sleep Concern No    Stress Concern No    Weight Concern No    Special Diet No     pretty well balanced diet but admits not eating as much as she used to    Back Care No    Exercise No     stays active, yardwork, gardening    Bike Helmet No    Seat Belt Yes    Self-Exams Yes     Social History Narrative     since 3/2011,  was Denilson Osullivan,  of lung cancer. Lives at home alone now. Still does all own ADL's except has a  come clean once a month, drives, has a walker at home but seldom uses. Does all her own cooking and other chores. Handles her own medications and finances.  Did PT for gait stability  and again spring 2017 x 2 months at [facility] History   Administered Date(s) Administered    Influenza High Dose Vaccine PF 2017    Influenza Vaccine 2015    Influenza Vaccine Split 2012    Pneumococcal Polysaccharide (PPSV-23) 2016    Pneumococcal Vaccine (Unspecified Type) 2007    TB Skin Test (PPD) Intradermal 07/06/2016    TD Vaccine 06/18/1990    Tdap 06/24/2016     Visit Vitals    /68 (BP 1 Location: Left arm, BP Patient Position: Sitting)    Pulse 76    Temp 97.9 °F (36.6 °C) (Oral)    Resp 14    Ht 5' 6\" (1.676 m)    Wt 102 lb 9.6 oz (46.5 kg)    LMP  (LMP Unknown)    SpO2 96%    BMI 16.56 kg/m2     Physical Exam   Constitutional: She is oriented to person, place, and time. No distress. Neck: Carotid bruit is not present. Cardiovascular: Normal rate, regular rhythm and normal heart sounds. Pulmonary/Chest: Effort normal. No respiratory distress. She has no wheezes. She has no rales. Abdominal: Soft. There is no tenderness. Musculoskeletal: She exhibits no edema. Neurological: She is alert and oriented to person, place, and time. Skin: Skin is warm. Psychiatric: She has a normal mood and affect. Her behavior is normal. Thought content normal.   Vitals reviewed. ASSESSMENT and PLAN    ICD-10-CM ICD-9-CM    1. Routine general medical examination at a health care facility Z00.00 V70.0 Medicare AWV, ; Honoring Choices packet given   2. Benign essential hypertension, good and stable I10 401.1 CBC WITH AUTOMATED DIFF      METABOLIC PANEL, COMPREHENSIVE      TSH 3RD GENERATION      URINALYSIS W/ RFLX MICROSCOPIC   3.  Hyperlipidemia, unspecified hyperlipidemia type E78.5 272.4 LIPID PANEL     Fasting labs today  Reviewed diet, nutrition, weight maintenance  I would like her to add Boost or Ensure 1-2 x a day  Cardiovascular risk and specific lipid/LDL goals reviewed  Reviewed medications and side effects; doing well on current regimen at this time  Further follow up & other recommendations pending review of labs as well  Plan on wt check in several weeks

## 2017-07-14 NOTE — PROGRESS NOTES
Lachelle Vang is a 78 y.o. female and presents for annual Medicare Wellness Visit. Problem List: Reviewed with patient and discussed risk factors. Patient Active Problem List   Diagnosis Code    Palpitations R00.2    Bilateral cataracts H26.9    Macular degeneration H35.30    Right hip pain M25.551    OA (osteoarthritis) M19.90    H/O B Lower Extremity Pain & Weakness, R>L M79.606    Hyperlipidemia E78.5    Postmenopause, LMP 54yo, No HRT Z78.0    B Foot Neuropathy  G57.90    Idiopathic peripheral neuropathy G60.9    H/O Gait Ataxia R27.0    Tobacco abuse Z72.0    ?  of History of heavy alcohol use Z87.898    H/O Idiopathic Microscopic Hematuria R31.29    H/O Left Breast Calcifications R92.1    S/P Left Humeral Fracture 2005 S42.309A    DDD (degenerative disc disease), cervical M50.30    DDD (degenerative disc disease), lumbar M51.36    Left sided sciatica M54.32    Left Cervical Radiculopathy, Cervical DDD, Fusion and Facet Arthropathy M54.12    Cervical myelopathy ([facility] [address] G95.9    Mammogram declined Z53.20    FHx: AAA [id number]    ACP (advance care planning) Z71.89    Benign essential hypertension I10    Chronic obstructive pulmonary disease (HCC) J44.9    Gait instability R26.81       Current medical providers:  Patient Care Team:  Bo Garcia MD as PCP - General    PSH: Reviewed with patient  Past Surgical History:   Procedure Laterality Date    HX COLONOSCOPY  ~2009    HX GYN  age 25    EAB   [de-identified] HIP REPLACEMENT  4/2010    left hip; Dr June Phelps [address] [facility]  9/2005    no surgery, but had left shoulder/humeral fx after fall-        SH: Reviewed with patient  Social History   Substance Use Topics    Smoking status: Current Every Day Smoker     Packs/day: 1.00     Years: 40.00     Types: Cigarettes    Smokeless tobacco: Never Used    Alcohol use 0.0 oz/week     0 Standard drinks or equivalent per week      Comment: 1 glass of red wine qhs       FH: Reviewed with patient  Family History   Problem Relation Age of Onset    Cancer Father      skin    Heart Attack Father       in his late [de-identified]    Hypertension Father     Arthritis-osteo Father     Other Father      AAA; pt had negative screen     Stroke Mother       in her late 66's    Hypertension Mother    [address] Arthritis-osteo Mother     Diabetes Mother    Loulou Hyde Sister      breast and lung    Hypertension Sister     Dementia Sister      Alzheimer's [place] nursing home [place]       Medications/Allergies: Reviewed with patient  Current Outpatient Prescriptions on File Prior to Visit   Medication Sig Dispense Refill    meloxicam (MOBIC) 15 mg tablet Take 1 Tab by mouth daily as needed (for arthritis pain). Take with food 30 Tab 3    metoprolol tartrate (LOPRESSOR) 50 mg tablet TAKE 1 TABLET BY MOUTH TWICE A  Tab 3    atorvastatin (LIPITOR) 10 mg tablet TAKE 1 TABLET BY MOUTH EVERY DAY 90 Tab 1    lisinopril (PRINIVIL, ZESTRIL) 40 mg tablet TAKE 1 TABLET BY MOUTH EVERY DAY 90 Tab 1    buprenorphine (BUTRANS) 5 mcg/hour patch 1 Patch by TransDERmal route every seven (7) days.  traMADol (ULTRAM) 50 mg tablet Take 50 mg by mouth daily as needed for Pain. Per Neuro Dr Akilah Silverman venlafaxine-SR [facility] 150 mg capsule TAKE ONE CAPSULE BY MOUTH EVERY DAY 30 Cap 0    DOCOSAHEXANOIC ACID/EPA (FISH OIL PO) Take 1 Cap by mouth two (2) times a day.  Calcium Carbonate-Vit D3-Min (CALTRATE 600+D PLUS MINERALS) 600 mg (1,500 mg)-400 unit Chew Take  by mouth two (2) times a day.  VIT C/DL-E AC/LUT/COPPER/ZNOX (PRESERVISION PO) Take 1 Tab by mouth two (2) times a day.  aspirin (ASPIRIN) 325 mg tablet Take 325 mg by mouth daily.  MULTIVITS W-FE,OTHER MIN (CENTRUM PO) Take 1 Tab by mouth daily. No current facility-administered medications on file prior to visit.        Allergies   Allergen Reactions    Sulfa (Sulfonamide Antibiotics) Nausea Only Objective:  Visit Vitals    /68 (BP 1 Location: Left arm, BP Patient Position: Sitting)    Pulse 76    Temp 97.9 °F (36.6 °C) (Oral)    Ht 5' 6\" (1.676 m)    Wt 102 lb 9.6 oz (46.5 kg)    LMP  (LMP Unknown)    SpO2 96%    BMI 16.56 kg/m2    Body mass index is 16.56 kg/(m^2). Assessment of cognitive impairment: Alert and oriented x 3    Depression Screen:   PHQ over the last two weeks 7/14/2017   Little interest or pleasure in doing things Not at all   Feeling down, depressed or hopeless Not at all   Total Score PHQ 2 0       Fall Risk Assessment:    Fall Risk Assessment, last 12 mths 7/14/2017   Able to walk? Yes   Fall in past 12 months? Yes   Fall with injury? No   Number of falls in past 12 months 3   Fall Risk Score 3       Functional Ability:   Does the patient exhibit a steady gait? Fairly, no assistive devices with her today, slightly ataxic (h/o this s/p PT recently) , has a walker but doesn't use it   How long did it take the patient to get up and walk from a sitting position? 2-3 sec   Is the patient self reliant?  (ie can do own laundry, meals, household chores)  Yes, she does have a  who comes once a month as well     Does the patient handle his/her own medications? yes     Does the patient handle his/her own money? yes     Is the patients home safe (ie good lighting, handrails on stairs and bath, etc.)? yes     Did you notice or did patient express any hearing difficulties? no     Did you notice or did patient express any vision difficulties? No, saw eye doctor Dr Magali Faith a few weeks ago     Were distance and reading eye charts used? no       Advance Care Planning:   Patient was offered the opportunity to discuss advance care planning:  yes     Does patient have an Advance Directive: not sure   If no, did you provide information on Caring Connections?   Yes, given Honoring Choices Packet       Plan:      No orders of the defined types were placed in this encounter. Health Maintenance   Topic Date Due    INFLUENZA AGE 9 TO ADULT  08/01/2017    GLAUCOMA SCREENING Q2Y  03/22/2018    MEDICARE YEARLY EXAM  07/15/2018    DTaP/Tdap/Td series (2 - Td) 06/24/2026    OSTEOPOROSIS SCREENING (DEXA)  Addressed    ZOSTER VACCINE AGE 60>  Addressed    Pneumococcal 65+ Low/Medium Risk  Completed       *Patient verbalized understanding and agreement with the plan. A copy of the After Visit Summary with personalized health plan was given to the patient today.

## 2017-07-14 NOTE — PROGRESS NOTES
Chief Complaint   Patient presents with    Hypertension     6 month follow up     1. Have you been to the ER, urgent care clinic since your last visit? Hospitalized since your last visit? No    2. Have you seen or consulted any other health care providers outside of the [address] [place] [address] since your last visit? Include any pap smears or colon screening.  Yes When: July 2017 Dr. Zoe Davis Pulmonology

## 2017-07-14 NOTE — MR AVS SNAPSHOT
Visit Information Date & Time Provider Department Dept. Phone Encounter #  
 7/14/2017 10:00 AM Michelle Parsons, [address] [phone] [id number] Upcoming Health Maintenance Date Due INFLUENZA AGE 9 TO ADULT 8/1/2017 GLAUCOMA SCREENING Q2Y 3/22/2018 MEDICARE YEARLY EXAM 7/15/2018 DTaP/Tdap/Td series (2 - Td) 6/24/2026 Allergies as of 7/14/2017  Review Complete On: 7/14/2017 By: Michelle Parsons MD  
  
 Severity Noted Reaction Type Reactions Sulfa (Sulfonamide Antibiotics)  06/25/2010    Nausea Only Current Immunizations  Reviewed on 7/14/2017 Name Date Influenza High Dose Vaccine PF 1/3/2017 Influenza Vaccine 3/9/2015 Influenza Vaccine Split 12/31/2012 Pneumococcal Polysaccharide (PPSV-23) 3/7/2016 Pneumococcal Vaccine (Unspecified Type) 9/3/2007 TB Skin Test (PPD) Intradermal 7/6/2016 TD Vaccine 6/18/1990 Tdap 6/24/2016 Reviewed by Michelle Parsons MD on 7/14/2017 at 10:37 AM  
You Were Diagnosed With   
  
 Codes Comments Routine general medical examination at a health care facility    -  Primary ICD-10-CM: Z00.00 ICD-9-CM: V70.0 Benign essential hypertension     ICD-10-CM: I10 
ICD-9-CM: 401.1 Hyperlipidemia, unspecified hyperlipidemia type     ICD-10-CM: E78.5 ICD-9-CM: 272.4 Gait instability     ICD-10-CM: R26.81 
ICD-9-CM: 690. 2 Vitals BP Pulse Temp Resp Height(growth percentile) Weight(growth percentile) 128/68 (BP 1 Location: Left arm, BP Patient Position: Sitting) 76 97.9 °F (36.6 °C) (Oral) 14 5' 6\" (1.676 m) 102 lb 9.6 oz (46.5 kg) LMP SpO2 BMI OB Status Smoking Status (LMP Unknown) 96% 16.56 kg/m2 Postmenopausal Current Every Day Smoker Vitals History BMI and BSA Data Body Mass Index Body Surface Area  
 16.56 kg/m 2 1.47 m 2 Preferred Pharmacy Pharmacy Name Phone [id number]- [place], [email or web address] [phone] Your Updated Medication List  
  
   
This list is accurate as of: 7/14/17 10:45 AM.  Always use your most recent med list.  
  
  
  
  
 aspirin 325 mg tablet Commonly known as:  ASPIRIN Take 325 mg by mouth daily. atorvastatin 10 mg tablet Commonly known as:  LIPITOR  
TAKE 1 TABLET BY MOUTH EVERY DAY  
  
 buprenorphine 5 mcg/hour patch Commonly known as:  BUTRANS  
1 Patch by TransDERmal route every seven (7) days. CALTRATE 600+D PLUS MINERALS 600 mg (1,500 mg)-400 unit Chew Generic drug:  Calcium Carbonate-Vit D3-Min Take  by mouth two (2) times a day. CENTRUM PO Take 1 Tab by mouth daily. FISH OIL PO Take 1 Cap by mouth two (2) times a day. lisinopril 40 mg tablet Commonly known as:  PRINIVIL, ZESTRIL  
TAKE 1 TABLET BY MOUTH EVERY DAY  
  
 meloxicam 15 mg tablet Commonly known as:  MOBIC Take 1 Tab by mouth daily as needed (for arthritis pain). Take with food  
  
 metoprolol tartrate 50 mg tablet Commonly known as:  LOPRESSOR  
TAKE 1 TABLET BY MOUTH TWICE A DAY PRESERVISION PO Take 1 Tab by mouth two (2) times a day. traMADol 50 mg tablet Commonly known as:  ULTRAM  
Take 50 mg by mouth daily as needed for Pain. Per Neuro Dr Shu Rice  
  
 venlafaxine- mg capsule Commonly known as:  EFFEXOR-XR  
TAKE ONE CAPSULE BY MOUTH EVERY DAY To-Do List   
 07/25/2017 12:00 PM  
  Appointment with [facility] ER 3 at [facility] [address] ([phone]) NON-CONTRAST STUDY: 1. Bring any non [facility] facility films/images pertaining to the area of interest with you on the day of appointment. 2. Check in at registration at least 30 minutes before appt time unless you were instructed to do otherwise.  3. If you have to drink oral contrast please pick it up any weekday prior to your appointment, if you cannot please check in 2 hrs  before appt time. Patient Instructions High-Calorie and High-Protein Diet: Care Instructions Your Care Instructions A high-calorie, high-protein diet gives you more energy and extra nutrition to help your body heal. Your doctor and dietitian can help you design a diet based on your health and what you prefer to eat. Always talk with your doctor or dietitian before you make changes in your diet. Follow-up care is a key part of your treatment and safety. Be sure to make and go to all appointments, and call your doctor if you are having problems. Its also a good idea to know your test results and keep a list of the medicines you take. How can you care for yourself at home? · Eat three meals a day, plus snacks in between and at bedtime. · Include favorite foods in your meals. This will help make meals more pleasant. · Drink your beverage at the end of the meal, because drinking before or during the meal can fill you up. · Eat high-protein foods, such as: ¨ Meat, fish, and poultry. ¨ Milk and milk products. Add powdered milk to other foods (such as pudding or soups) to boost the protein. ¨ Eggs. ¨ Cooked dried beans and peas. ¨ Peanut butter, nuts, and seeds. ¨ Tofu. ¨ Cheeses. ¨ Protein bars. · Eat high-calorie foods, such as: 
¨ Butter, honey, and brown sugar, added to foods to make them taste better. ¨ Oils, sauces, and gravies. ¨ Peanut butter. ¨ Whole milk, yogurt, mayonnaise, and sour cream. 
¨ Granola cereal with fruit and granola bars. ¨ Muffins, pancakes, waffles, and other breads. ¨ Milkshakes, puddings, and custard. · Try high-energy drinks, such as Ensure, Boost, or instant breakfast drinks, if you have trouble eating solid foods. They will give you both calories and protein. Soups and smoothies also are good sources of nutrition. · Keep snacks around that are easy to eat, such as pudding, energy bars, ice cream, and flavored ice pops. · If you can, take a walk before you eat, to make you hungrier. · Do not waste energy eating foods that do not give you much nutrition, such as potato chips, candy bars, and soft drinks. · Drink plenty of fluids. If you have kidney, heart, or liver disease and have to limit fluids, talk with your doctor before you increase the amount of fluids you drink. Where can you learn more? Go to http://elizabeth-fab.info/. Enter A918 in the search box to learn more about \"High-Calorie and High-Protein Diet: Care Instructions. \" Current as of: July 26, 2016 Content Version: 11.3 © 4744-3383 [employer]. Care instructions adapted under license by [employer] (which disclaims liability or warranty for this information). If you have questions about a medical condition or this instruction, always ask your healthcare professional. [place] [address] any warranty or liability for your use of this information. Advance Directives: Care Instructions Your Care Instructions An advance directive is a legal way to state your wishes at the end of your life. It tells your family and your doctor what to do if you can no longer say what you want. There are two main types of advance directives. You can change them any time that your wishes change. · A living will tells your family and your doctor your wishes about life support and other treatment. · A durable power of  for health care lets you name a person to make treatment decisions for you when you can't speak for yourself. This person is called a health care agent. If you do not have an advance directive, decisions about your medical care may be made by a doctor or a  who doesn't know you. It may help to think of an advance directive as a gift to the people who care for you. If you have one, they won't have to make tough decisions by themselves. Follow-up care is a key part of your treatment and safety. Be sure to make and go to all appointments, and call your doctor if you are having problems. It's also a good idea to know your test results and keep a list of the medicines you take. How can you care for yourself at home? · Discuss your wishes with your loved ones and your doctor. This way, there are no surprises. · Many states have a unique form. Or you might use a universal form that has been approved by many states. This kind of form can sometimes be completed and stored online. Your electronic copy will then be available wherever you have a connection to the Internet. In most cases, doctors will respect your wishes even if you have a form from a different state. · You don't need a  to do an advance directive. But you may want to get legal advice. · Think about these questions when you prepare an advance directive: ¨ Who do you want to make decisions about your medical care if you are not able to? Many people choose a family member or close friend. ¨ Do you know enough about life support methods that might be used? If not, talk to your doctor so you understand. ¨ What are you most afraid of that might happen? You might be afraid of having pain, losing your independence, or being kept alive by machines. ¨ Where would you prefer to die? Choices include your home, a hospital, or a nursing home. ¨ Would you like to have information about hospice care to support you and your family? ¨ Do you want to donate organs when you die? ¨ Do you want certain Denominational practices performed before you die? If so, put your wishes in the advance directive. · Read your advance directive every year, and make changes as needed. When should you call for help? Be sure to contact your doctor if you have any questions. Where can you learn more? Go to http://elizabeth-fab.info/. Enter R264 in the search box to learn more about \"Advance Directives: Care Instructions. \" Current as of: November 17, 2016 Content Version: 11.3 © 9730-7833 [employer]. Care instructions adapted under license by [employer] (which disclaims liability or warranty for this information). If you have questions about a medical condition or this instruction, always ask your healthcare professional. [address] any warranty or liability for your use of this information. Schedule of Personalized Health Plan (Provide Copy to Patient) The best way to stay healthy is to live a healthy lifestyle. A healthy lifestyle includes regular exercise, eating a well-balanced diet, keeping a healthy weight and not smoking. Regular physical exams and screening tests are another important way to take care of yourself. Preventive exams provided by health care providers can find health problems early when treatment works best and can keep you from getting certain diseases or illnesses. Preventive services include exams, lab tests, screenings, shots, monitoring and information to help you take care of your own health. All people over 65 should have a pneumonia shot. Pneumonia shots are usually only needed once in a lifetime unless your doctor decides differently. All people over 65 should have a yearly flu shot. People over 65 are at medium to high risk for Hepatitis B. Three shots are needed for complete protection. In addition to your physical exam, some screening tests are recommended: 
 
Bone mass measurement (dexa scan) is recommended every two years Diabetes Mellitus screening is recommended every year. Glaucoma is an eye disease caused by high pressure in the eye. An eye exam is recommended every year. Cardiovascular screening tests that check your cholesterol and other blood fat (lipid) levels are recommended every five years. Colorectal Cancer screening tests help to find pre-cancerous polyps (growths in the colon) so they can be removed before they turn into cancer. Tests ordered for screening depend on your personal and family history risk factors. Screening for Breast Cancer is recommended yearly with a mammogram. 
 
Screening for Cervical Cancer is recommended every two years (annually for certain risk factors, such as previous history of STD or abnormal PAP in past 7 years), with a Pelvic Exam with PAP Here is a list of your current Health Maintenance items with a due date: 
Health Maintenance Topic Date Due  
 INFLUENZA AGE 9 TO ADULT  08/01/2017  GLAUCOMA SCREENING Q2Y  03/22/2018  MEDICARE YEARLY EXAM  07/15/2018  DTaP/Tdap/Td series (2 - Td) 06/24/2026  
 OSTEOPOROSIS SCREENING (DEXA)  Addressed  ZOSTER VACCINE AGE 60>  Addressed  Pneumococcal 65+ Low/Medium Risk  Completed Introducing [facility]! Román Blue introduces Cellectar patient portal. Now you can access parts of your medical record, email your doctor's office, and request medication refills online. 1. In your internet browser, go to [email or web address] [employer]/BIBA Apparels 2. Click on the First Time User? Click Here link in the Sign In box. You will see the New Member Sign Up page. 3. Enter your Cellectar Access Code exactly as it appears below. You will not need to use this code after youve completed the sign-up process. If you do not sign up before the expiration date, you must request a new code. · Cellectar Access Code: [email or web address] Expires: 9/26/2017  8:57 AM 
 
4. Enter the last four digits of your Social Security Number (xxxx) and Date of Birth (mm/dd/yyyy) as indicated and click Submit. You will be taken to the next sign-up page. 5. Create a Cellectar ID. This will be your Cellectar login ID and cannot be changed, so think of one that is secure and easy to remember. 6. Create a Asteres password. You can change your password at any time. 7. Enter your Password Reset Question and Answer. This can be used at a later time if you forget your password. 8. Enter your e-mail address. You will receive e-mail notification when new information is available in [address] 9. Click Sign Up. You can now view and download portions of your medical record. 10. Click the Download Summary menu link to download a portable copy of your medical information. If you have questions, please visit the Frequently Asked Questions section of the Asteres website. Remember, Asteres is NOT to be used for urgent needs. For medical emergencies, dial 911. Now available from your iPhone and Android! Please provide this summary of care documentation to your next provider. Your primary care clinician is listed as AXEL WALSH. If you have any questions after today's visit, please call [phone].

## 2017-07-15 LAB
APPEARANCE UR: ABNORMAL
BACTERIA #/AREA URNS HPF: ABNORMAL /[HPF]
BILIRUB UR QL STRIP: NEGATIVE
CASTS URNS MICRO: ABNORMAL
CASTS URNS QL MICRO: PRESENT /LPF
COLOR UR: YELLOW
EPI CELLS #/AREA URNS HPF: ABNORMAL /HPF
GLUCOSE UR QL: NEGATIVE
HGB UR QL STRIP: ABNORMAL
KETONES UR QL STRIP: NEGATIVE
LEUKOCYTE ESTERASE UR QL STRIP: ABNORMAL
MICRO URNS: ABNORMAL
MUCOUS THREADS URNS QL MICRO: PRESENT
NITRITE UR QL STRIP: POSITIVE
PH UR STRIP: 7 [PH] (ref 5–7.5)
PROT UR QL STRIP: ABNORMAL
RBC #/AREA URNS HPF: ABNORMAL /HPF
SP GR UR: 1.02 (ref 1–1.03)
UROBILINOGEN UR STRIP-MCNC: 0.2 MG/DL (ref 0.2–1)
WBC #/AREA URNS HPF: >30 /HPF

## 2017-07-16 LAB
ALBUMIN SERPL-MCNC: 3.7 G/DL (ref 3.5–4.8)
ALBUMIN/GLOB SERPL: 1.3 {RATIO} (ref 1.2–2.2)
ALP SERPL-CCNC: 92 IU/L (ref 39–117)
ALT SERPL-CCNC: 16 IU/L (ref 0–32)
AST SERPL-CCNC: 24 IU/L (ref 0–40)
BASOPHILS # BLD AUTO: 0 X10E3/UL (ref 0–0.2)
BASOPHILS NFR BLD AUTO: 0 %
BILIRUB SERPL-MCNC: 0.5 MG/DL (ref 0–1.2)
BUN SERPL-MCNC: 22 MG/DL (ref 8–27)
BUN/CREAT SERPL: 28 (ref 12–28)
CALCIUM SERPL-MCNC: 9.5 MG/DL (ref 8.7–10.3)
CHLORIDE SERPL-SCNC: 98 MMOL/L (ref 96–106)
CHOLEST SERPL-MCNC: 125 MG/DL (ref 100–199)
CO2 SERPL-SCNC: 22 MMOL/L (ref 18–29)
CREAT SERPL-MCNC: 0.78 MG/DL (ref 0.57–1)
EOSINOPHIL # BLD AUTO: 0.1 X10E3/UL (ref 0–0.4)
EOSINOPHIL NFR BLD AUTO: 1 %
ERYTHROCYTE [DISTWIDTH] IN BLOOD BY AUTOMATED COUNT: 13.5 % (ref 12.3–15.4)
GLOBULIN SER CALC-MCNC: 2.8 G/DL (ref 1.5–4.5)
GLUCOSE SERPL-MCNC: 101 MG/DL (ref 65–99)
HCT VFR BLD AUTO: 38.9 % (ref 34–46.6)
HDLC SERPL-MCNC: 48 MG/DL
HGB BLD-MCNC: 12.3 G/DL (ref 11.1–15.9)
IMM GRANULOCYTES # BLD: 0 X10E3/UL (ref 0–0.1)
IMM GRANULOCYTES NFR BLD: 0 %
INTERPRETATION, 910389: NORMAL
LDLC SERPL CALC-MCNC: 57 MG/DL (ref 0–99)
LYMPHOCYTES # BLD AUTO: 1.5 X10E3/UL (ref 0.7–3.1)
LYMPHOCYTES NFR BLD AUTO: 17 %
MCH RBC QN AUTO: 29.5 PG (ref 26.6–33)
MCHC RBC AUTO-ENTMCNC: 31.6 G/DL (ref 31.5–35.7)
MCV RBC AUTO: 93 FL (ref 79–97)
MONOCYTES # BLD AUTO: 0.5 X10E3/UL (ref 0.1–0.9)
MONOCYTES NFR BLD AUTO: 5 %
NEUTROPHILS # BLD AUTO: 6.5 X10E3/UL (ref 1.4–7)
NEUTROPHILS NFR BLD AUTO: 77 %
PLATELET # BLD AUTO: 237 X10E3/UL (ref 150–379)
POTASSIUM SERPL-SCNC: 4.4 MMOL/L (ref 3.5–5.2)
PROT SERPL-MCNC: 6.5 G/DL (ref 6–8.5)
RBC # BLD AUTO: 4.17 X10E6/UL (ref 3.77–5.28)
SODIUM SERPL-SCNC: 141 MMOL/L (ref 134–144)
TRIGL SERPL-MCNC: 98 MG/DL (ref 0–149)
TSH SERPL DL<=0.005 MIU/L-ACNC: 1.31 UIU/ML (ref 0.45–4.5)
VLDLC SERPL CALC-MCNC: 20 MG/DL (ref 5–40)
WBC # BLD AUTO: 8.6 X10E3/UL (ref 3.4–10.8)

## 2017-07-25 ENCOUNTER — ANESTHESIA EVENT (OUTPATIENT)
Dept: ENDOSCOPY | Age: 77
End: 2017-07-25
Payer: MEDICARE

## 2017-07-25 ENCOUNTER — HOSPITAL ENCOUNTER (OUTPATIENT)
Age: 77
Setting detail: OUTPATIENT SURGERY
Discharge: HOME | End: 2017-07-25
Attending: INTERNAL MEDICINE | Admitting: INTERNAL MEDICINE
Payer: MEDICARE

## 2017-07-25 ENCOUNTER — HOSPITAL ENCOUNTER (OUTPATIENT)
Dept: CT IMAGING | Age: 77
Discharge: HOME | End: 2017-07-25
Attending: INTERNAL MEDICINE
Payer: MEDICARE

## 2017-07-25 ENCOUNTER — ANESTHESIA (OUTPATIENT)
Dept: ENDOSCOPY | Age: 77
End: 2017-07-25
Payer: MEDICARE

## 2017-07-25 VITALS
WEIGHT: 102 LBS | HEART RATE: 65 BPM | RESPIRATION RATE: 18 BRPM | BODY MASS INDEX: 16.39 KG/M2 | HEIGHT: 66 IN | OXYGEN SATURATION: 93 % | DIASTOLIC BLOOD PRESSURE: 52 MMHG | SYSTOLIC BLOOD PRESSURE: 104 MMHG | TEMPERATURE: 98.2 F

## 2017-07-25 DIAGNOSIS — R93.89 ABNORMAL RADIOLOGICAL FINDINGS IN SKIN AND SUBCUTANEOUS TISSUE: ICD-10-CM

## 2017-07-25 LAB
APPEARANCE FLD: ABNORMAL
COLOR FLD: ABNORMAL
EOSINOPHIL # FLD: 1 %
LYMPHOCYTES NFR FLD: 3 %
MONOS+MACROS NFR FLD: 14 %
NEUTS SEG NFR FLD: 75 %
NUC CELL # FLD: 70 /CU MM (ref 0–5)
OTHER CELL,FOTHC: 7 %
RBC # FLD: >100 /CU MM
SPECIMEN SOURCE FLD: ABNORMAL

## 2017-07-25 PROCEDURE — 74011250636 HC RX REV CODE- 250/636

## 2017-07-25 PROCEDURE — 87102 FUNGUS ISOLATION CULTURE: CPT | Performed by: INTERNAL MEDICINE

## 2017-07-25 PROCEDURE — 71250 CT THORAX DX C-: CPT

## 2017-07-25 PROCEDURE — 87070 CULTURE OTHR SPECIMN AEROBIC: CPT | Performed by: INTERNAL MEDICINE

## 2017-07-25 PROCEDURE — 87149 DNA/RNA DIRECT PROBE: CPT | Performed by: INTERNAL MEDICINE

## 2017-07-25 PROCEDURE — 76040000019: Performed by: INTERNAL MEDICINE

## 2017-07-25 PROCEDURE — 87116 MYCOBACTERIA CULTURE: CPT | Performed by: INTERNAL MEDICINE

## 2017-07-25 PROCEDURE — 88305 TISSUE EXAM BY PATHOLOGIST: CPT | Performed by: INTERNAL MEDICINE

## 2017-07-25 PROCEDURE — 87186 SC STD MICRODIL/AGAR DIL: CPT | Performed by: INTERNAL MEDICINE

## 2017-07-25 PROCEDURE — 76060000033 HC ANESTHESIA 1 TO 1.5 HR: Performed by: NURSE ANESTHETIST, CERTIFIED REGISTERED

## 2017-07-25 PROCEDURE — 88112 CYTOPATH CELL ENHANCE TECH: CPT | Performed by: INTERNAL MEDICINE

## 2017-07-25 PROCEDURE — 74011000250 HC RX REV CODE- 250

## 2017-07-25 PROCEDURE — 89050 BODY FLUID CELL COUNT: CPT | Performed by: INTERNAL MEDICINE

## 2017-07-25 PROCEDURE — 76040000020: Performed by: INTERNAL MEDICINE

## 2017-07-25 RX ORDER — SODIUM CHLORIDE 9 MG/ML
50 INJECTION, SOLUTION INTRAVENOUS CONTINUOUS
Status: DISCONTINUED | OUTPATIENT
Start: 2017-07-25 | End: 2017-07-26 | Stop reason: HOSPADM

## 2017-07-25 RX ORDER — SODIUM CHLORIDE 9 MG/ML
INJECTION, SOLUTION INTRAVENOUS
Status: DISCONTINUED | OUTPATIENT
Start: 2017-07-25 | End: 2017-07-25 | Stop reason: HOSPADM

## 2017-07-25 RX ORDER — DEXAMETHASONE SODIUM PHOSPHATE 4 MG/ML
INJECTION, SOLUTION INTRA-ARTICULAR; INTRALESIONAL; INTRAMUSCULAR; INTRAVENOUS; SOFT TISSUE AS NEEDED
Status: DISCONTINUED | OUTPATIENT
Start: 2017-07-25 | End: 2017-07-25 | Stop reason: HOSPADM

## 2017-07-25 RX ORDER — FENTANYL CITRATE 50 UG/ML
INJECTION, SOLUTION INTRAMUSCULAR; INTRAVENOUS
Status: DISPENSED
Start: 2017-07-25 | End: 2017-07-26

## 2017-07-25 RX ORDER — ONDANSETRON 2 MG/ML
INJECTION INTRAMUSCULAR; INTRAVENOUS AS NEEDED
Status: DISCONTINUED | OUTPATIENT
Start: 2017-07-25 | End: 2017-07-25 | Stop reason: HOSPADM

## 2017-07-25 RX ORDER — ROCURONIUM BROMIDE 10 MG/ML
INJECTION, SOLUTION INTRAVENOUS AS NEEDED
Status: DISCONTINUED | OUTPATIENT
Start: 2017-07-25 | End: 2017-07-25 | Stop reason: HOSPADM

## 2017-07-25 RX ORDER — SUCCINYLCHOLINE CHLORIDE 20 MG/ML
INJECTION INTRAMUSCULAR; INTRAVENOUS AS NEEDED
Status: DISCONTINUED | OUTPATIENT
Start: 2017-07-25 | End: 2017-07-25 | Stop reason: HOSPADM

## 2017-07-25 RX ORDER — GLYCOPYRROLATE 0.2 MG/ML
INJECTION INTRAMUSCULAR; INTRAVENOUS AS NEEDED
Status: DISCONTINUED | OUTPATIENT
Start: 2017-07-25 | End: 2017-07-25 | Stop reason: HOSPADM

## 2017-07-25 RX ORDER — SODIUM CHLORIDE 0.9 % (FLUSH) 0.9 %
5-10 SYRINGE (ML) INJECTION AS NEEDED
Status: DISCONTINUED | OUTPATIENT
Start: 2017-07-25 | End: 2017-07-26 | Stop reason: HOSPADM

## 2017-07-25 RX ORDER — LIDOCAINE HYDROCHLORIDE 20 MG/ML
JELLY TOPICAL ONCE
Status: ACTIVE | OUTPATIENT
Start: 2017-07-25 | End: 2017-07-26

## 2017-07-25 RX ORDER — LIDOCAINE HYDROCHLORIDE 20 MG/ML
1-20 INJECTION, SOLUTION EPIDURAL; INFILTRATION; INTRACAUDAL; PERINEURAL ONCE
Status: ACTIVE | OUTPATIENT
Start: 2017-07-25 | End: 2017-07-26

## 2017-07-25 RX ORDER — NEOSTIGMINE METHYLSULFATE 1 MG/ML
INJECTION INTRAVENOUS AS NEEDED
Status: DISCONTINUED | OUTPATIENT
Start: 2017-07-25 | End: 2017-07-25 | Stop reason: HOSPADM

## 2017-07-25 RX ORDER — PHENYLEPHRINE HCL IN 0.9% NACL 0.4MG/10ML
SYRINGE (ML) INTRAVENOUS AS NEEDED
Status: DISCONTINUED | OUTPATIENT
Start: 2017-07-25 | End: 2017-07-25 | Stop reason: HOSPADM

## 2017-07-25 RX ORDER — LIDOCAINE HYDROCHLORIDE AND EPINEPHRINE 10; 10 MG/ML; UG/ML
INJECTION, SOLUTION INFILTRATION; PERINEURAL
Status: DISPENSED
Start: 2017-07-25 | End: 2017-07-26

## 2017-07-25 RX ORDER — PROPOFOL 10 MG/ML
INJECTION, EMULSION INTRAVENOUS AS NEEDED
Status: DISCONTINUED | OUTPATIENT
Start: 2017-07-25 | End: 2017-07-25 | Stop reason: HOSPADM

## 2017-07-25 RX ORDER — FENTANYL CITRATE 50 UG/ML
INJECTION, SOLUTION INTRAMUSCULAR; INTRAVENOUS AS NEEDED
Status: DISCONTINUED | OUTPATIENT
Start: 2017-07-25 | End: 2017-07-25 | Stop reason: HOSPADM

## 2017-07-25 RX ORDER — SODIUM CHLORIDE 0.9 % (FLUSH) 0.9 %
5-10 SYRINGE (ML) INJECTION EVERY 8 HOURS
Status: DISCONTINUED | OUTPATIENT
Start: 2017-07-25 | End: 2017-07-26 | Stop reason: HOSPADM

## 2017-07-25 RX ORDER — LIDOCAINE HYDROCHLORIDE 20 MG/ML
INJECTION, SOLUTION EPIDURAL; INFILTRATION; INTRACAUDAL; PERINEURAL AS NEEDED
Status: DISCONTINUED | OUTPATIENT
Start: 2017-07-25 | End: 2017-07-25 | Stop reason: HOSPADM

## 2017-07-25 RX ORDER — LIDOCAINE HYDROCHLORIDE 20 MG/ML
5 JELLY TOPICAL ONCE
Status: ACTIVE | OUTPATIENT
Start: 2017-07-25 | End: 2017-07-26

## 2017-07-25 RX ADMIN — DEXAMETHASONE SODIUM PHOSPHATE 10 MG: 4 INJECTION, SOLUTION INTRA-ARTICULAR; INTRALESIONAL; INTRAMUSCULAR; INTRAVENOUS; SOFT TISSUE at 13:18

## 2017-07-25 RX ADMIN — Medication 200 MCG: at 13:22

## 2017-07-25 RX ADMIN — ONDANSETRON 4 MG: 2 INJECTION INTRAMUSCULAR; INTRAVENOUS at 13:18

## 2017-07-25 RX ADMIN — ROCURONIUM BROMIDE 25 MG: 10 INJECTION, SOLUTION INTRAVENOUS at 13:10

## 2017-07-25 RX ADMIN — ROCURONIUM BROMIDE 5 MG: 10 INJECTION, SOLUTION INTRAVENOUS at 13:00

## 2017-07-25 RX ADMIN — GLYCOPYRROLATE 0.4 MG: 0.2 INJECTION INTRAMUSCULAR; INTRAVENOUS at 13:52

## 2017-07-25 RX ADMIN — LIDOCAINE HYDROCHLORIDE 80 MG: 20 INJECTION, SOLUTION EPIDURAL; INFILTRATION; INTRACAUDAL; PERINEURAL at 13:00

## 2017-07-25 RX ADMIN — Medication 120 MCG: at 13:29

## 2017-07-25 RX ADMIN — SODIUM CHLORIDE: 9 INJECTION, SOLUTION INTRAVENOUS at 13:16

## 2017-07-25 RX ADMIN — SODIUM CHLORIDE: 9 INJECTION, SOLUTION INTRAVENOUS at 12:45

## 2017-07-25 RX ADMIN — FENTANYL CITRATE 100 MCG: 50 INJECTION, SOLUTION INTRAMUSCULAR; INTRAVENOUS at 13:00

## 2017-07-25 RX ADMIN — NEOSTIGMINE METHYLSULFATE 2.5 MG: 1 INJECTION INTRAVENOUS at 13:52

## 2017-07-25 RX ADMIN — PROPOFOL 120 MG: 10 INJECTION, EMULSION INTRAVENOUS at 13:00

## 2017-07-25 RX ADMIN — SUCCINYLCHOLINE CHLORIDE 120 MG: 20 INJECTION INTRAMUSCULAR; INTRAVENOUS at 13:00

## 2017-07-25 RX ADMIN — Medication 40 MCG: at 13:51

## 2017-07-25 RX ADMIN — PROPOFOL 80 MG: 10 INJECTION, EMULSION INTRAVENOUS at 13:11

## 2017-07-25 NOTE — IP AVS SNAPSHOT
[address] 
[phone] Patient: Gail Ojeda 
MRN: [id number] JERONIMO:0/3/8163 You are allergic to the following Allergen Reactions Sulfa (Sulfonamide Antibiotics) Nausea Only Recent Documentation Height Weight BMI OB Status Smoking Status 1.676 m 46.3 kg 16.46 kg/m2 Postmenopausal Current Every Day Smoker Emergency Contacts Name Discharge Info Relation Home Work Mobile ANDUJAR [facility] DISCHARGE CAREGIVER [3] Friend [5] [phone] About your hospitalization You were admitted on:  July 25, 2017 You last received care in the:  [facility] ENDOSCOPY You were discharged on:  July 25, 2017 Unit phone number:  [phone] Why you were hospitalized Your primary diagnosis was:  Not on File Providers Seen During Your Hospitalizations Provider Role Specialty Primary office phone Teresa Silva MD Attending Provider Pulmonary Disease [phone] Your Primary Care Physician (PCP) Primary Care Physician Office Phone Office Fax Ed Hancock [phone] Follow-up Information Follow up With Details Comments Contact Info Rubi Alvarenga MD   [address] [address] 
[phone] Current Discharge Medication List  
  
ASK your doctor about these medications Dose & Instructions Dispensing Information Comments Morning Noon Evening Bedtime  
 aspirin 325 mg tablet Commonly known as:  ASPIRIN Your last dose was: Your next dose is:    
   
   
 Dose:  325 mg Take 325 mg by mouth daily. Refills:  0  
     
   
   
   
  
 atorvastatin 10 mg tablet Commonly known as:  LIPITOR Your last dose was: Your next dose is: TAKE 1 TABLET BY MOUTH EVERY DAY Quantity:  90 Tab Refills:  1 buprenorphine 5 mcg/hour patch Commonly known as:  Reji Doyle Your last dose was: Your next dose is:    
   
   
 Dose:  1 Patch 1 Patch by TransDERmal route every seven (7) days. Refills:  0  
     
   
   
   
  
 CALTRATE 600+D PLUS MINERALS 600 mg (1,500 mg)-400 unit Chew Generic drug:  Calcium Carbonate-Vit D3-Min Your last dose was: Your next dose is: Take  by mouth two (2) times a day. Refills:  0 CENTRUM PO Your last dose was: Your next dose is:    
   
   
 Dose:  1 Tab Take 1 Tab by mouth daily. Refills:  0  
     
   
   
   
  
 FISH OIL PO Your last dose was: Your next dose is:    
   
   
 Dose:  1 Cap Take 1 Cap by mouth two (2) times a day. Refills:  0  
     
   
   
   
  
 lisinopril 40 mg tablet Commonly known as:  [place]iz[place] Drought Your last dose was: Your next dose is: TAKE 1 TABLET BY MOUTH EVERY DAY Quantity:  90 Tab Refills:  1  
     
   
   
   
  
 meloxicam 15 mg tablet Commonly known as:  MOBIC Your last dose was: Your next dose is:    
   
   
 Dose:  15 mg Take 1 Tab by mouth daily as needed (for arthritis pain). Take with food Quantity:  30 Tab Refills:  3  
     
   
   
   
  
 metoprolol tartrate 50 mg tablet Commonly known as:  LOPRESSOR Your last dose was: Your next dose is: TAKE 1 TABLET BY MOUTH TWICE A DAY Quantity:  180 Tab Refills:  3 PRESERVISION PO Your last dose was: Your next dose is:    
   
   
 Dose:  1 Tab Take 1 Tab by mouth two (2) times a day. Refills:  0  
     
   
   
   
  
 traMADol 50 mg tablet Commonly known as:  ULTRAM  
   
Your last dose was: Your next dose is:    
   
   
 Dose:  50 mg Take 50 mg by mouth daily as needed for Pain. Per Neuro Dr Angie Palmer Refills:  0 venlafaxine- mg capsule Commonly known as:  EFFEXOR-XR Your last dose was: Your next dose is: TAKE ONE CAPSULE BY MOUTH EVERY DAY Quantity:  30 Cap Refills:  0 Discharge Instructions Name: Lachelle Vang  
: 1940 MRN: [id number] Date: 2017 2:00 PM  
 
BRONCHOSCOPY / EUS / EBUS DISCHARGE INSTRUCTIONS Discomfort: 
Sore throat- throat lozenges or warm salt water gargle 
redness at IV site- apply warm compress to area; if redness or soreness persist- contact your physician Gaseous discomfort- walking, belching will help relieve any discomfort Should not operate a vehicle for at least 12 hours You should not engage in an occupation involving machinery or appliances for rest of today You should not drink alcoholic beverages for at least 12 hours Avoid making any critical decisions for at least 24 hour Blood tinged secretions  this should stop within 2-3 hours DIET Nothing by mouth- do not eat or drink for two hours. You may eat and drink after 4 pm 
 You may resume your regular diet  however -  remember your colon is empty  and a heavy meal will produce gas. Avoid these foods:  vegetables, fried / greasy foods, carbonated drinks MEDICATIONS: 
May resume pre-procedure medications ACTIVITY You may resume your normal daily activities however it is recommended that you spend the remainder of the day resting -  avoid any strenuous activity. CALL M.D. ANY SIGN OF Increasing pain, nausea, vomiting New increased bleeding Fever (chills) Pain in chest area Bloody discharge from nose or mouth Shortness of breath Bubbles under the skin around the collarbone. These may crackle and pop when you press on them. Coughing up more than ½ cup of blood. Call physicians office for the following Results of procedure / biopsy in 4 days Follow up appointment:  Dr Surya Macias on Wed Aug 2 at 10 am 
Telephone #  [phone] Discharge Orders None ACO Transitions of Care Introducing [employer] [address] Aranda offers a voluntary care coordination program to provide high quality service and care to [facility] fee-for-service beneficiaries. Niru Schrader was designed to help you enhance your health and well-being through the following services: ? Transitions of Care  support for individuals who are transitioning from one care setting to another (example: Hospital to home). ? Chronic and Complex Care Coordination  support for individuals and caregivers of those with serious or chronic illnesses or with more than one chronic (ongoing) condition and those who take a number of different medications. If you meet specific medical criteria, a [address] [place] [address] Rd may call you directly to coordinate your care with your primary care physician and your other care providers. For questions about the [facility] programs, please, contact your physicians office. For general questions or additional information about Accountable Care Organizations: 
Please visit www.medicare.gov/acos. html or call 1-800-MEDICARE (1-254.165.4922) TTY users should call 0-364.581.3673. [employer] Announcement We are excited to announce that we are making your provider's discharge notes available to you in [employer]. You will see these notes when they are completed and signed by the physician that discharged you from your recent hospital stay. If you have any questions or concerns about any information you see in [employer], please call the Health Information Department where you were seen or reach out to your Primary Care Provider for more information about your plan of care. Introducing [facility] & HEALTH SERVICES!    
 [employer] introduces [employer] patient portal. Now you can access parts of your medical record, email your doctor's office, and request medication refills online. 1. In your internet browser, go to [email or web address] [employer] 2. Click on the First Time User? Click Here link in the Sign In box. You will see the New Member Sign Up page. 3. Enter your [employer] Access Code exactly as it appears below. You will not need to use this code after youve completed the sign-up process. If you do not sign up before the expiration date, you must request a new code. · [employer] Access Code: [email or web address] Expires: 9/26/2017  8:57 AM 
 
4. Enter the last four digits of your Social Security Number (xxxx) and Date of Birth (mm/dd/yyyy) as indicated and click Submit. You will be taken to the next sign-up page. 5. Create a [employer] ID. This will be your [employer] login ID and cannot be changed, so think of one that is secure and easy to remember. 6. Create a [employer] password. You can change your password at any time. 7. Enter your Password Reset Question and Answer. This can be used at a later time if you forget your password. 8. Enter your e-mail address. You will receive e-mail notification when new information is available in [address] 9. Click Sign Up. You can now view and download portions of your medical record. 10. Click the Download Summary menu link to download a portable copy of your medical information. If you have questions, please visit the Frequently Asked Questions section of the [employer] website. Remember, [employer] is NOT to be used for urgent needs. For medical emergencies, dial 911. Now available from your iPhone and Android! General Information Please provide this summary of care documentation to your next provider. Patient Signature:  ____________________________________________________________ Date:  ____________________________________________________________  
  
Charisse Fines Provider Signature:  ____________________________________________________________ Date:  ____________________________________________________________

## 2017-07-25 NOTE — DISCHARGE INSTRUCTIONS
Name: Gerardo Henson   : 1940   MRN: [id number]   Date: 2017 2:00 PM     BRONCHOSCOPY / EUS / EBUS DISCHARGE INSTRUCTIONS  Discomfort:  Sore throat- throat lozenges or warm salt water gargle  redness at IV site- apply warm compress to area; if redness or soreness persist- contact your physician  Gaseous discomfort- walking, belching will help relieve any discomfort  Should not operate a vehicle for at least 12 hours  You should not engage in an occupation involving machinery or appliances for rest of today  You should not drink alcoholic beverages for at least 12 hours  Avoid making any critical decisions for at least 24 hour  Blood tinged secretions - this should stop within 2-3 hours  DIET  Nothing by mouth- do not eat or drink for two hours. You may eat and drink after 4 pm   You may resume your regular diet - however -  remember your colon is empty  and a heavy meal will produce gas. Avoid these foods:  vegetables, fried / greasy foods, carbonated drinks  MEDICATIONS:  May resume pre-procedure medications  ACTIVITY  You may resume your normal daily activities however it is recommended that you spend the remainder of the day resting -  avoid any strenuous activity. CALL M.D. ANY SIGN OF   Increasing pain, nausea, vomiting  New increased bleeding  Fever (chills)  Pain in chest area  Bloody discharge from nose or mouth  Shortness of breath  Bubbles under the skin around the collarbone. These may crackle and pop when you press on them. Coughing up more than ½ cup of blood.     Call [phone] office for the following  Results of procedure / biopsy in 4 days  Follow up appointment:  Dr Stoll Never on Wed Aug 2 at 10 am  Telephone #  [phone]

## 2017-07-25 NOTE — ANESTHESIA PREPROCEDURE EVALUATION
Anesthetic History               Review of Systems / Medical History      Pulmonary    COPD      Smoker         Neuro/Psych             Comments: Bilateral cataracts (H26.9) 3/16/2010       Macular degeneration (H35.30)    Chronic pain (G89.29)  chronic RLE pain/weakness              NEUROPATHY PERIPHERAL   Left sided sciatica (M54.32) 9/22/2011       Left Cervical Radiculopathy, Cervical DDD, Fusion and Facet Arthropathy (M54.12) 9/22/2011       Cervical myelopathy (HCC) (G95.9) 3/26/2012       Polyneuropathy, Myelitis (G04.91)               Cardiovascular    Hypertension          Hyperlipidemia    Exercise tolerance: <4 METS  Comments: Palpitations (R00.2)   GI/Hepatic/Renal  Within defined limits              Endo/Other        Arthritis     Other Findings              Physical Exam    Airway  Mallampati: II  TM Distance: 4 - 6 cm  Neck ROM: normal range of motion   Mouth opening: Normal     Cardiovascular  Regular rate and rhythm,  S1 and S2 normal,  no murmur, click, rub, or gallop  Rhythm: regular  Rate: normal         Dental  No notable dental hx       Pulmonary  Breath sounds clear to auscultation               Abdominal  GI exam deferred       Other Findings            Anesthetic Plan    ASA: 3  Anesthesia type: general          Induction: Intravenous  Anesthetic plan and risks discussed with: Patient

## 2017-07-25 NOTE — ANESTHESIA POSTPROCEDURE EVALUATION
Post-Anesthesia Evaluation and Assessment    Patient: Val Enciso MRN: [id number]  SSN: [id number]    YOB: 1940  Age: 78 y.o. Sex: female       Cardiovascular Function/Vital Signs  Visit Vitals    /61    Pulse 73    Temp 36.6 °C (97.8 °F)    Resp 26    Ht 5' 6\" (1.676 m)    Wt 46.3 kg (102 lb)    SpO2 100%    BMI 16.46 kg/m2       Patient is status post general anesthesia for Procedure(s):  BRONCHOSCOPY NAVIGATIONAL  ENDOSCOPIC BRUSHING. Nausea/Vomiting: None    Postoperative hydration reviewed and adequate. Pain:  Pain Scale 1: Numeric (0 - 10) (07/25/17 1213)  Pain Intensity 1: 0 (07/25/17 1213)   Managed    Neurological Status: At baseline    Mental Status and Level of Consciousness: Arousable    Pulmonary Status:   O2 Device: Nasal cannula (07/25/17 1407)   Adequate oxygenation and airway patent    Complications related to anesthesia: None    Post-anesthesia assessment completed.  No concerns    Signed By: Benjamin Bustillo MD     July 25, 2017

## 2017-07-25 NOTE — IP AVS SNAPSHOT
[address] 
[phone] Patient: Caridad Leon 
MRN: [id number] OZI:1/0/0758 Current Discharge Medication List  
  
ASK your doctor about these medications Dose & Instructions Dispensing Information Comments Morning Noon Evening Bedtime  
 aspirin 325 mg tablet Commonly known as:  ASPIRIN Your last dose was: Your next dose is:    
   
   
 Dose:  325 mg Take 325 mg by mouth daily. Refills:  0  
     
   
   
   
  
 atorvastatin 10 mg tablet Commonly known as:  LIPITOR Your last dose was: Your next dose is: TAKE 1 TABLET BY MOUTH EVERY DAY Quantity:  90 Tab Refills:  1  
     
   
   
   
  
 buprenorphine 5 mcg/hour patch Commonly known as:  [place] Sobieski Your last dose was: Your next dose is:    
   
   
 Dose:  1 Patch 1 Patch by TransDERmal route every seven (7) days. Refills:  0  
     
   
   
   
  
 CALTRATE 600+D PLUS MINERALS 600 mg (1,500 mg)-400 unit Chew Generic drug:  Calcium Carbonate-Vit D3-Min Your last dose was: Your next dose is: Take  by mouth two (2) times a day. Refills:  0 CENTRUM PO Your last dose was: Your next dose is:    
   
   
 Dose:  1 Tab Take 1 Tab by mouth daily. Refills:  0  
     
   
   
   
  
 FISH OIL PO Your last dose was: Your next dose is:    
   
   
 Dose:  1 Cap Take 1 Cap by mouth two (2) times a day. Refills:  0  
     
   
   
   
  
 lisinopril 40 mg tablet Commonly known as:  [place] Your last dose was: Your next dose is: TAKE 1 TABLET BY MOUTH EVERY DAY Quantity:  90 Tab Refills:  1  
     
   
   
   
  
 meloxicam 15 mg tablet Commonly known as:  MOBIC Your last dose was:     
   
Your next dose is:    
   
   
 Dose:  15 mg  
 Take 1 Tab by mouth daily as needed (for arthritis pain). Take with food Quantity:  30 Tab Refills:  3  
     
   
   
   
  
 metoprolol tartrate 50 mg tablet Commonly known as:  LOPRESSOR Your last dose was: Your next dose is: TAKE 1 TABLET BY MOUTH TWICE A DAY Quantity:  180 Tab Refills:  3 PRESERVISION PO Your last dose was: Your next dose is:    
   
   
 Dose:  1 Tab Take 1 Tab by mouth two (2) times a day. Refills:  0  
     
   
   
   
  
 traMADol 50 mg tablet Commonly known as:  ULTRAM  
   
Your last dose was: Your next dose is:    
   
   
 Dose:  50 mg Take 50 mg by mouth daily as needed for Pain. Per Neuro Dr Abbott Sic Refills:  0  
     
   
   
   
  
 venlafaxine- mg capsule Commonly known as:  EFFEXOR-XR Your last dose was: Your next dose is: TAKE ONE CAPSULE BY MOUTH EVERY DAY Quantity:  30 Cap Refills:  0

## 2017-07-25 NOTE — DISCHARGE SUMMARY
Pulmonary    Pt tolerated procedure well    Navigational bronchoscopy performed with transbronchial biopsies obtained from LLL nodule - Preliminary path - reactive  Cytobrushing also performed in the LLL nodule under navigational guidance  Bronchial wash obtained from LLL as wall    BAL performed x 3 in the LUIS    No complications    Pt to follow up in the office to review results on Wed Aug 2 at 10 am    Donna Brennan MD

## 2017-07-27 LAB
BACTERIA SPEC CULT: NORMAL
GRAM STN SPEC: NORMAL
SERVICE CMNT-IMP: NORMAL

## 2017-08-02 ENCOUNTER — TELEPHONE (OUTPATIENT)
Dept: FAMILY MEDICINE CLINIC | Age: 77
End: 2017-08-02

## 2017-08-02 DIAGNOSIS — R00.2 PALPITATIONS: ICD-10-CM

## 2017-08-02 DIAGNOSIS — I10 BENIGN ESSENTIAL HYPERTENSION: ICD-10-CM

## 2017-08-02 DIAGNOSIS — N39.0 URINARY TRACT INFECTION WITHOUT HEMATURIA, SITE UNSPECIFIED: Primary | ICD-10-CM

## 2017-08-02 RX ORDER — NITROFURANTOIN 25; 75 MG/1; MG/1
100 CAPSULE ORAL 2 TIMES DAILY
Qty: 14 CAP | Refills: 0 | OUTPATIENT
Start: 2017-08-02 | End: 2017-08-09

## 2017-08-03 RX ORDER — ATORVASTATIN CALCIUM 10 MG/1
TABLET, FILM COATED ORAL
Qty: 90 TAB | Refills: 3 | Status: SHIPPED | OUTPATIENT
Start: 2017-08-03

## 2017-08-03 RX ORDER — LISINOPRIL 40 MG/1
TABLET ORAL
Qty: 90 TAB | Refills: 0 | Status: SHIPPED | OUTPATIENT
Start: 2017-08-03 | End: 2018-01-16

## 2017-08-03 NOTE — TELEPHONE ENCOUNTER
I believe there are 2 or 3 different encounters pending on pt right now for various issues. Please check other notes I forwarded and attempt to reach pt again. May want to review and consolidate into one phone call when you speak w/ her.

## 2017-08-03 NOTE — TELEPHONE ENCOUNTER
Lipids excellent. Blood counts, liver, kidney, thyroid all normal  UA is c/w UTI. I would like to start treatment and have her do repeat UA in 7-10 days. After r/w pt may phone in rx for abx. Follow up urine order already pended.

## 2017-08-03 NOTE — TELEPHONE ENCOUNTER
Advise pt her BP's have been running on the low end the last several times she has been here, heart rate in the 60's and currently seeing Pulm for lung disease, so I would like to reduce the dose of her Metoprolol from 50 bid to 25 bid and see how she does on that. I worry that the higher dose and lower BP's/heart rates will increase risk of dizziness and falls (she already has gait issues). ricki can take 1/2 of the 50 mg tabs until they run out, then I can change to the 25 mg pill if she does ok on that regimen. Please route back to me after r/w pt so I can update this medication change in chart.  Have her do a 3 month follow up BP check, does NOT need to fast. Set now

## 2017-08-04 NOTE — TELEPHONE ENCOUNTER
Moo Benitez MD        8/3/17 9:48 AM   Note      Advise pt her BP's have been running on the low end the last several times she has been here, heart rate in the 60's and currently seeing Pulm for lung disease, so I would like to reduce the dose of her Metoprolol from 50 bid to 25 bid and see how she does on that. I worry that the higher dose and lower BP's/heart rates will increase risk of dizziness and falls (she already has gait issues). ricki can take 1/2 of the 50 mg tabs until they run out, then I can change to the 25 mg pill if she does ok on that regimen. Please route back to me after r/w pt so I can update this medication change in chart.  Have her do a 3 month follow up BP check, does NOT need to fast. Set now

## 2017-08-04 NOTE — TELEPHONE ENCOUNTER
PI of results and she will come back to repeat ua ~ 8/14. I have called in the [address] Also addressed changing the metoprolol. She states understanding and repeats back to me. I let her know that we will send over the new rx for that but she doesn't need to p/u until she is ready for it. She will cut the 50 mg pill in half and finish what she has. I set up the f/u BP appt for 11/7 @ 1:45.

## 2017-08-06 RX ORDER — METOPROLOL TARTRATE 25 MG/1
25 TABLET, FILM COATED ORAL 2 TIMES DAILY
Qty: 90 TAB | Refills: 0 | Status: SHIPPED | OUTPATIENT
Start: 2017-08-06

## 2017-08-15 ENCOUNTER — TELEPHONE (OUTPATIENT)
Dept: FAMILY MEDICINE CLINIC | Age: 77
End: 2017-08-15

## 2017-08-15 NOTE — TELEPHONE ENCOUNTER
Pt states that she missed her appt yesterday. Advised that she didn't need an appt she can come anytime.

## 2017-08-16 ENCOUNTER — HOSPITAL ENCOUNTER (OUTPATIENT)
Dept: LAB | Age: 77
Discharge: HOME | End: 2017-08-16
Payer: MEDICARE

## 2017-08-16 ENCOUNTER — LAB ONLY (OUTPATIENT)
Dept: FAMILY MEDICINE CLINIC | Age: 77
End: 2017-08-16

## 2017-08-16 DIAGNOSIS — N39.0 URINARY TRACT INFECTION WITHOUT HEMATURIA, SITE UNSPECIFIED: ICD-10-CM

## 2017-08-16 LAB
ACID FAST STN SPEC: NEGATIVE
M AVIUM CMPLX RRNA SPEC QL PROBE: POSITIVE
M GORDONAE RRNA SPEC QL PROBE: ABNORMAL
M KANSASII RRNA SPEC QL PROBE: ABNORMAL
M TB CMPLX RRNA SPEC QL PROBE: NEGATIVE
MYCOBACTERIUM SPEC QL CULT: POSITIVE
OTHER, RAFBI6: ABNORMAL
SPECIMEN PREPARATION: ABNORMAL
SPECIMEN SOURCE: ABNORMAL
SPECIMEN SOURCE: ABNORMAL
SUSCEPT TESTING, RAFBI7: ABNORMAL

## 2017-08-16 PROCEDURE — 87086 URINE CULTURE/COLONY COUNT: CPT

## 2017-08-16 PROCEDURE — 81001 URINALYSIS AUTO W/SCOPE: CPT

## 2017-08-17 LAB
AMIKACIN ISLT MIC: ABNORMAL
CIPROFLOXACIN ISLT MIC: ABNORMAL
CLARITHRO ISLT MIC: ABNORMAL
ETHAMBUTOL ISLT MIC: ABNORMAL
LINEZOLID ISLT MIC: ABNORMAL
MICROORGANISM/AGENT SPEC: ABNORMAL
MOXIFLOXACIN ISLT MIC: ABNORMAL
RIFAMPIN ISLT MIC: ABNORMAL
SPECIMEN SOURCE: ABNORMAL
STREPTOMYCIN ISLT MIC: ABNORMAL

## 2017-08-19 LAB
APPEARANCE UR: ABNORMAL
BACTERIA #/AREA URNS HPF: ABNORMAL /[HPF]
BACTERIA UR CULT: NO GROWTH
BILIRUB UR QL STRIP: NEGATIVE
CASTS URNS QL MICRO: ABNORMAL /LPF
COLOR UR: YELLOW
CRYSTALS URNS MICRO: ABNORMAL
EPI CELLS #/AREA URNS HPF: ABNORMAL /HPF
GLUCOSE UR QL: NEGATIVE
HGB UR QL STRIP: NEGATIVE
KETONES UR QL STRIP: NEGATIVE
LEUKOCYTE ESTERASE UR QL STRIP: ABNORMAL
MICRO URNS: ABNORMAL
MUCOUS THREADS URNS QL MICRO: PRESENT
NITRITE UR QL STRIP: NEGATIVE
PH UR STRIP: 6 [PH] (ref 5–7.5)
PROT UR QL STRIP: ABNORMAL
RBC #/AREA URNS HPF: ABNORMAL /HPF
SP GR UR: 1.02 (ref 1–1.03)
UNIDENT CRYS URNS QL MICRO: PRESENT
URINALYSIS REFLEX, 377202: ABNORMAL
UROBILINOGEN UR STRIP-MCNC: 0.2 MG/DL (ref 0.2–1)
WBC #/AREA URNS HPF: >30 /HPF

## 2017-08-28 LAB
BACTERIA SPEC CULT: NORMAL
SERVICE CMNT-IMP: NORMAL

## 2017-09-17 RX ORDER — MELOXICAM 15 MG/1
TABLET ORAL
Qty: 30 TAB | Refills: 3 | Status: SHIPPED | OUTPATIENT
Start: 2017-09-17 | End: 2018-01-16

## 2018-01-05 ENCOUNTER — TELEPHONE (OUTPATIENT)
Dept: FAMILY MEDICINE CLINIC | Age: 78
End: 2018-01-05

## 2018-01-05 NOTE — TELEPHONE ENCOUNTER
----- Message from Lashay Ba sent at 1/5/2018  3:06 PM EST -----  Regarding: Dr. Viki Benjamin  Pt missed her last appt due to being in the hospital and has come home from rehab as of Wednesday. She would like to reschedule with Dr. Renan Sterling only as soon as possible. Next available 1/25/18 was too far out. Best contact number [phone].

## 2018-01-05 NOTE — TELEPHONE ENCOUNTER
Advised patient that Anuja Goldsmith would call her back on Monday 1/8/18 to try to get her in sooner  I did not see anything available at all this month.

## 2018-01-08 NOTE — TELEPHONE ENCOUNTER
Called pt and she went to [facility] EMERGENCY [facility] and was admitted. Was d/c to [facility] and just got out last week. She says that she does have [facility] it sounds like PT. She did have a nurse come last week but doesn't know when she will be back. She was d/c'd by [facility] \"b/c she ran out of money\". I got notes from [facility] and [facility]. Scheduled pt for 1/16@ 11:00. Pt informed.

## 2018-01-16 ENCOUNTER — HOSPITAL ENCOUNTER (OUTPATIENT)
Dept: LAB | Age: 78
Discharge: HOME | End: 2018-01-16
Payer: MEDICARE

## 2018-01-16 ENCOUNTER — TELEPHONE (OUTPATIENT)
Dept: FAMILY MEDICINE CLINIC | Age: 78
End: 2018-01-16

## 2018-01-16 ENCOUNTER — OFFICE VISIT (OUTPATIENT)
Dept: FAMILY MEDICINE CLINIC | Age: 78
End: 2018-01-16

## 2018-01-16 ENCOUNTER — PATIENT OUTREACH (OUTPATIENT)
Dept: FAMILY MEDICINE CLINIC | Age: 78
End: 2018-01-16

## 2018-01-16 VITALS
WEIGHT: 91 LBS | RESPIRATION RATE: 16 BRPM | HEART RATE: 98 BPM | OXYGEN SATURATION: 96 % | HEIGHT: 66 IN | SYSTOLIC BLOOD PRESSURE: 101 MMHG | TEMPERATURE: 97.5 F | DIASTOLIC BLOOD PRESSURE: 85 MMHG | BODY MASS INDEX: 14.63 KG/M2

## 2018-01-16 DIAGNOSIS — R23.4: ICD-10-CM

## 2018-01-16 DIAGNOSIS — A31.0 PULMONARY MYCOBACTERIUM AVIUM COMPLEX (MAC) INFECTION (HCC): ICD-10-CM

## 2018-01-16 DIAGNOSIS — E46 MALNUTRITION, UNSPECIFIED TYPE (HCC): ICD-10-CM

## 2018-01-16 DIAGNOSIS — Z71.89 ACP (ADVANCE CARE PLANNING): ICD-10-CM

## 2018-01-16 DIAGNOSIS — F32.A DEPRESSION, UNSPECIFIED DEPRESSION TYPE: ICD-10-CM

## 2018-01-16 DIAGNOSIS — Z78.9 SELF-CARE DEFICIT IN PATIENT LIVING ALONE: ICD-10-CM

## 2018-01-16 DIAGNOSIS — R91.8 MULTIPLE PULMONARY NODULES: ICD-10-CM

## 2018-01-16 DIAGNOSIS — I48.91 ATRIAL FIBRILLATION WITH RAPID VENTRICULAR RESPONSE (HCC): ICD-10-CM

## 2018-01-16 DIAGNOSIS — J44.9 CHRONIC OBSTRUCTIVE PULMONARY DISEASE, UNSPECIFIED COPD TYPE (HCC): ICD-10-CM

## 2018-01-16 DIAGNOSIS — R53.1 WEAKNESS GENERALIZED: Primary | ICD-10-CM

## 2018-01-16 DIAGNOSIS — R63.4 WEIGHT LOSS: ICD-10-CM

## 2018-01-16 PROCEDURE — 80053 COMPREHEN METABOLIC PANEL: CPT

## 2018-01-16 PROCEDURE — 84443 ASSAY THYROID STIM HORMONE: CPT

## 2018-01-16 PROCEDURE — 82607 VITAMIN B-12: CPT

## 2018-01-16 PROCEDURE — 82746 ASSAY OF FOLIC ACID SERUM: CPT

## 2018-01-16 PROCEDURE — 82306 VITAMIN D 25 HYDROXY: CPT

## 2018-01-16 PROCEDURE — 85025 COMPLETE CBC W/AUTO DIFF WBC: CPT

## 2018-01-16 RX ORDER — POTASSIUM CHLORIDE 20 MEQ/1
20 TABLET, EXTENDED RELEASE ORAL DAILY
COMMUNITY

## 2018-01-16 RX ORDER — DIGOXIN 125 MCG
0.12 TABLET ORAL DAILY
COMMUNITY

## 2018-01-16 RX ORDER — OMEGA-3 FATTY ACIDS 1000 MG
2 CAPSULE ORAL DAILY
COMMUNITY

## 2018-01-16 RX ORDER — ASPIRIN 81 MG/1
TABLET ORAL DAILY
COMMUNITY

## 2018-01-16 NOTE — ACP (ADVANCE CARE PLANNING)
Patient is unsure if she has an AMD. Close friend accompanies her today, but is unfamiliar as well. Her closest next of kin is a niece in [place] and her POA is her . Initial ACP discussion. AMD form reviewed and copy provided. NN/facilitator to discuss with patient. I introduced patient to [place] in office today. NN will contact pt at home later this week to help facilitate this discussion and plans. 16 minute discussion.

## 2018-01-16 NOTE — MR AVS SNAPSHOT
[address] [place] [address] 
[phone] Patient: Pb Perry 
MRN: [id number] OAN:[birth year] Visit Information Date & Time Provider Department Dept. Phone Encounter #  
 1/16/2018 11:00 AM [address] [phone] [id number] Upcoming Health Maintenance Date Due  
 GLAUCOMA SCREENING Q2Y 3/22/2018 MEDICARE YEARLY EXAM 7/15/2018 DTaP/Tdap/Td series (2 - Td) 6/24/2026 Allergies as of 1/16/2018  Review Complete On: 1/16/2018 By: [address], [place]  
  
 Severity Noted Reaction Type Reactions Sulfa (Sulfonamide Antibiotics)  06/25/2010    Nausea Only Current Immunizations  Reviewed on 1/16/2018 Name Date Influenza High Dose Vaccine PF 1/3/2017 Influenza Vaccine 3/9/2015 Influenza Vaccine Split 12/31/2012 Pneumococcal Polysaccharide (PPSV-23) 3/7/2016 TB Skin Test (PPD) 11/12/2017, 10/30/2017 TB Skin Test (PPD) Intradermal 7/6/2016 TD Vaccine 6/18/1990 Tdap 6/24/2016 ZZZ-RETIRED (DO NOT USE) Pneumococcal Vaccine (Unspecified Type) 9/3/2007 Reviewed by [address], [place] on 1/16/2018 at 11:27 AM  
You Were Diagnosed With   
  
 Codes Comments Weakness generalized    -  Primary ICD-10-CM: R53.1 ICD-9-CM: 780.79 Weight loss     ICD-10-CM: R63.4 ICD-9-CM: 783.21 Malnutrition, unspecified type ([facility]     ICD-10-CM: E46 
ICD-9-CM: 263.9 Self-care deficit in patient living alone     ICD-10-CM: R46.89 
ICD-9-CM: V40.39 Atrial fibrillation with rapid ventricular response (HCC)     ICD-10-CM: I48.91 
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 ([facility]     ICD-10-CM: A31.0 ICD-9-CM: 031.0 Chronic obstructive pulmonary disease, unspecified COPD type ([address]     ICD-10-CM: J44.9 ICD-9-CM: 364 Multiple pulmonary nodules     ICD-10-CM: R91.8 ICD-9-CM: 793.19 Eschar of trunk     ICD-10-CM: R23.4 ICD-9-CM: 782.8 ACP (advance care planning)     ICD-10-CM: Z71.89 ICD-9-CM: V65.49 Depression, unspecified depression type     ICD-10-CM: F32.9 ICD-9-CM: 777 Vitals BP Pulse Temp Resp Height(growth percentile) Weight(growth percentile) 101/85 (BP 1 Location: Left arm, BP Patient Position: Sitting) 98 97.5 °F (36.4 °C) (Oral) 16 5' 6\" (1.676 m) 91 lb (41.3 kg) LMP SpO2 BMI OB Status Smoking Status (LMP Unknown) 96% 14.69 kg/m2 Postmenopausal Current Every Day Smoker Vitals History BMI and BSA Data Body Mass Index Body Surface Area  
 14.69 kg/m 2 1.39 m 2 Preferred Pharmacy Pharmacy Name Phone [id number], [address] [facility] [phone] Your Updated Medication List  
  
   
This list is accurate as of: 1/16/18  9:24 PM.  Always use your most recent med list.  
  
  
  
  
 aspirin delayed-release 81 mg tablet Take  by mouth daily. atorvastatin 10 mg tablet Commonly known as:  LIPITOR  
TAKE 1 TABLET BY MOUTH EVERY DAY  
  
 buprenorphine 5 mcg/hour patch Commonly known as:  BUTRANS  
1 Patch by TransDERmal route every seven (7) days. Per Neurology  Indications: Chronic Pain CALTRATE 600+D PLUS MINERALS 600 mg (1,500 mg)-400 unit Chew Generic drug:  Calcium Carbonate-Vit D3-Min Take  by mouth daily. CENTRUM PO Take 1 Tab by mouth daily. digoxin 0.125 mg tablet Commonly known as:  LANOXIN Take 0.125 mg by mouth daily. HOLD FOR PULSE<55  
  
 ELIQUIS 2.5 mg tablet Generic drug:  apixaban Take 2.5 mg by mouth two (2) times a day. FISH OIL CONCENTRATE 1,000 mg Cap Generic drug:  omega-3 fatty acids Take 2 Caps by mouth daily. metoprolol tartrate 25 mg tablet Commonly known as:  LOPRESSOR Take 1 Tab by mouth two (2) times a day. Indications: hypertension, palpitations potassium chloride 20 mEq tablet Commonly known as:  K-DUR, KLOR-CON Take 20 mEq by mouth daily. PRESERVISION PO Take 1 Tab by mouth daily. traMADol 50 mg tablet Commonly known as:  ULTRAM  
Take 50 mg by mouth daily as needed for Pain. Per Neuro Dr Bustillo Leonor  
  
 venlafaxine- mg capsule Commonly known as:  EFFEXOR-XR  
TAKE ONE CAPSULE BY MOUTH EVERY DAY We Performed the Following CBC WITH AUTOMATED DIFF [15922 CPT(R)] FOLATE [id number] CPT(R)] METABOLIC PANEL, COMPREHENSIVE [48170 CPT(R)] REFERRAL TO [facility] PROGRAMS [[id number] Custom] Comments:  
 patient has been referred into the [facility] Programs indicated above. She is currently being managed for the following chronic conditions: has Palpitations, Bilateral cataracts, Macular degeneration, Right hip pain, OA (osteoarthritis), H/O B Lower Extremity Pain & Weakness, R>L, Hyperlipidemia, Postmenopause, LMP 52yo, No HRT, B Foot Neuropathy , Idiopathic peripheral neuropathy, H/O Gait Ataxia, Tobacco abuse, ? of History of heavy alcohol use, H/O Idiopathic Microscopic Hematuria, H/O Left Breast Calcifications, S/P Left Humeral Fracture 2005, DDD (degenerative disc disease), cervical, DDD (degenerative disc disease), lumbar, Left sided sciatica, Left Cervical Radiculopathy, Cervical DDD, Fusion and Facet Arthropathy, Mammogram declined, FHx: AAA, ACP (advance care planning), Benign essential hypertension, Chronic obstructive pulmonary disease ([address] Gait instability, Multiple pulmonary nodules and cavitary lesions, Atrial fibrillation with rapid ventricular response ([address] and Pulmonary Mycobacterium avium complex (MAC) infection ([address] on her problem list.  
 TSH 3RD GENERATION [23224 CPT(R)] VITAMIN B12 [id number] CPT(R)] VITAMIN D, 25 HYDROXY [id number] CPT(R)] Referral Information Referral ID Referred By Referred To  
  
 [id number] Quin ARAGON Not Available Visits Status Start Date End Date 1 New Request 1/16/18 1/16/19 If your referral has a status of pending review or denied, additional information will be sent to support the outcome of this decision. Patient Instructions Advance Directives: Care Instructions Your Care Instructions An advance directive is a legal way to state your wishes at the end of your life. It tells your family and your doctor what to do if you can no longer say what you want. There are two main types of advance directives. You can change them any time that your wishes change. · A living will tells your family and your doctor your wishes about life support and other treatment. · A durable power of  for health care lets you name a person to make treatment decisions for you when you can't speak for yourself. This person is called a health care agent. If you do not have an advance directive, decisions about your medical care may be made by a doctor or a  who doesn't know you. It may help to think of an advance directive as a gift to the people who care for you. If you have one, they won't have to make tough decisions by themselves. Follow-up care is a key part of your treatment and safety. Be sure to make and go to all appointments, and call your doctor if you are having problems. It's also a good idea to know your test results and keep a list of the medicines you take. How can you care for yourself at home? · Discuss your wishes with your loved ones and your doctor. This way, there are no surprises. · Many states have a unique form. Or you might use a universal form that has been approved by many states. This kind of form can sometimes be completed and stored online. Your electronic copy will then be available wherever you have a connection to the Internet. In most cases, doctors will respect your wishes even if you have a form from a different state. · You don't need a  to do an advance directive. But you may want to get legal advice. · Think about these questions when you prepare an advance directive: ¨ Who do you want to make decisions about your medical care if you are not able to? Many people choose a family member or close friend. ¨ Do you know enough about life support methods that might be used? If not, talk to your doctor so you understand. ¨ What are you most afraid of that might happen? You might be afraid of having pain, losing your independence, or being kept alive by machines. ¨ Where would you prefer to die? Choices include your home, a hospital, or a nursing home. ¨ Would you like to have information about hospice care to support you and your family? ¨ Do you want to donate organs when you die? ¨ Do you want certain Sabianism practices performed before you die? If so, put your wishes in the advance directive. · Read your advance directive every year, and make changes as needed. When should you call for help? Be sure to contact your doctor if you have any questions. Where can you learn more? Go to http://elizabethgrabHalofab.info/. Enter R264 in the search box to learn more about \"Advance Directives: Care Instructions. \" Current as of: September 24, 2016 Content Version: 11.4 © 4149-4126 [employer]wise[employer]. Care instructions adapted under license by [employer] (which disclaims liability or warranty for this information). If you have questions about a medical condition or this instruction, always ask your healthcare professional. [place] [address] any warranty or liability for your use of this information. Introducing [facility]! Joselyn Resendez introduces [employer] patient portal. Now you can access parts of your medical record, email your doctor's office, and request medication refills online. 1. In your internet browser, go to [email or web address] 2. Click on the First Time User? Click Here link in the Sign In box. You will see the New Member Sign Up page. 3. Enter your [employer] Access Code exactly as it appears below. You will not need to use this code after youve completed the sign-up process. If you do not sign up before the expiration date, you must request a new code. · [employer] Access Code: [email or web address] Expires: 4/16/2018 12:17 PM 
 
4. Enter the last four digits of your Social Security Number (xxxx) and Date of Birth (mm/dd/yyyy) as indicated and click Submit. You will be taken to the next sign-up page. 5. Create a [employer] ID. This will be your [employer] login ID and cannot be changed, so think of one that is secure and easy to remember. 6. Create a [employer] password. You can change your password at any time. 7. Enter your Password Reset Question and Answer. This can be used at a later time if you forget your password. 8. Enter your e-mail address. You will receive e-mail notification when new information is available in [address] 9. Click Sign Up. You can now view and download portions of your medical record. 10. Click the Download Summary menu link to download a portable copy of your medical information. If you have questions, please visit the Frequently Asked Questions section of the [employer] website. Remember, [employer] is NOT to be used for urgent needs. For medical emergencies, dial 911. Now available from your iPhone and Android! Please provide this summary of care documentation to your next provider. Your primary care clinician is listed as AXEL WALSH. If you have any questions after today's visit, please call [phone].

## 2018-01-16 NOTE — TELEPHONE ENCOUNTER
Call from [facility]. She is at pt's home and pt is c/'o of severe lower abd pain. VS stable 130/74, T 98.4, P 84 and R 18, P ox 98%. The pt was fine while in the office. She had some lunch of bologna sandwich, chips and ensure. The pain started half way thru the meal.  Pt had a normal BM yesterday. She rates the pain 8 out of 10. Advised that Dr Fatmata Hicks is gone for the day. If severe pt should go to ER for eval.    I did ask Verenice if  has a  available and she states that they do . She gave me the # for the clinical supervisor Liv Osborn [phone]. I called Liv Osborn and left a message asking her to have the  go out eval for services and maybe check into [place].

## 2018-01-16 NOTE — PROGRESS NOTES
Chief Complaint   Patient presents with   [facility]     follow up from [facility] and [facility]      1. Have you been to the ER, urgent care clinic since your last visit? Hospitalized since your last visit? Yes [facility] on [facility]    2. Have you seen or consulted any other health care providers outside of the [address] [place] [address] since your last visit? Include any pap smears or colon screening.    Yes see above

## 2018-01-16 NOTE — PROGRESS NOTES
Crissy Reyes   HPI  Mrs. Charu Matamoros is a 77 yo  who was brought in by a close friend today for Transition of Care services following hospital admission to [facility] 10/25/17-10/30/17 for progressive weakness and Atrial Fibrillation w/ RVR, then transitioned to [facility] 10/30/17 through 1/4/17. Our office Nurse Navigator was advised of patient's case today and met briefly w/ patient in office today w/ plans to perform more of a detailed formal outreach and coordination of care moving forward. Most of today's history is obtained from patient, friend/ who accompanies her today and upon review of hospital records/reports we requested and received from [facility] and [facility] prior to today's appointment. Patient has been living alone w/ PMHx significant for Hypertension, Hyperlipidemia, Chronic neuropathic pain disorder/peripheral neuropathy, Gait instability, Arthritis, Macular Degeneration, and Chronic lung disease w/ pulmonary cavitary lesions and MAC infection who presented to [facility] ER via car by her friend who reported noticing patient's progressive weakness and decreased ability to care for herself in her home. Back on the day of 10-25, her friend went to pick her up to take her to a routine doctor's appointment w/ ID, but upon arrival she discovered patient to be extremely weak, thin and frail appearing. She instead drove her directly to [facility] ER for evaluation. Upon arrival she was found to be weak, anxious, dehydrated. Her BP's were ranging from 72//89, heart rates 126-166, sats %, temp 97.5, RR 18-23. Heart was irregularly irregular and she was found to be in A Fib w/ RVR. Hgb was 13.9, sodium 136, K+ 4.3, cr 0.9, cardiac enzymes negative, AST/ALT normal, Calcium 9.9, TSH 1.4  CXR showed increased density both pulmonary apices, R>L.  Chest CT c/w chronic bi-apical cavitary lesions (stable from prior CT's; she has h/o this dx ~ 2 yrs prior and followed by Pulm Dr Moriah Conn w/ bronchoscopy and dc of chronic MAC infection, has been ruled out for TB on multiple occasions including current admission per ID w/ Quant Gold testing). She was admitted to telemetry and started on IV Cardizem drip w/ cardiology consult, pulmonary consult, ID consult. She was started on IV CTX and Azithro for possible UTI while urine/blood cultures were pended. She was also placed on Cefepime and Flagyl per ID. She was seen by PT/OT/Nutrition. On 10/30 she was transferred to [facility] where she received ongoing therapy and medical mgt by the house physician. She was discharged from there back to her home alone on 1/4/18 and has been receiving [facility] through [facility]. She states the nurse has been coming about once a week and PT/OT coming about 2 x a week each. She is ambulatory using her rolling walker. They were managing a wound on her right lower back which pt states she was getting topical treatment for but now the area has healed over. It has not been draining, malodorous, itchy or painful. She has been afebrile. Lives at home alone now.  comes to clean once a month  She was driving up until recent hospital admission  for weakness  Now her close friend transports her when needed  She self grooms but gets assistance from the therapist if needed when they are out there. She bathed herself today and states she did \"ok\" but it took her a while. Does her own meal preparation which is minimal now since her progressive weakness and hospitalization . She makes cold cut sandwiches, toast, soup, eggs. She is currently only eating about 2 meals a day. She was getting 3 meals prepared for her at [facility]. Upon DC from [facility] her wt was 95.8 lbs and is now down to 91 lbs here in office today. Handles her own medications and finances. Closest next of kin is her niece Reta Saldana, lives in [place], moving to [place] 2-2018.   Patient has designated her POA her , Mady Nowak    Prior to admission she was being followed by ID and Pulmonary. She thinks she may have seen ID 2-3 times since being dc'd (?Dr Samy Zheng). There was discussion about placing her on multiregimen of abx long term but patient states it was going to be expensive and she was uncertain if she would be able to afford it and was soliciting her niece (closest next of kin) to assist w/ this. That is where things have left off. She has not had follow up w/ Pulmonary yet but will be scheduling to see Dr Aylin Roberson sometime in the next few days. She was advised to follow up w/ cardiology, but she doesn't know w/ whom or when and does not think she has anything set up for this yet. REVIEW OF SYMPTOMS     Review of Systems   Constitutional: Positive for weight loss. Negative for chills and fever. Respiratory: Positive for cough (chronic cough w/ occ sputum, no acute changes). Negative for shortness of breath and wheezing. Cardiovascular: Negative for chest pain, palpitations and leg swelling. Gastrointestinal: Negative for abdominal pain, blood in stool, diarrhea, nausea and vomiting. No bowel irregularity or fecal incontinence. Has BM's ~ q 2-3 days but normal consistency. Genitourinary: Negative for dysuria, frequency and hematuria. Wears a panty liner for occasional bladder leakage if she cant make it to the bathroom due to decreased mobility and generalized weakness   Neurological: Negative for dizziness and headaches.    Psychiatric/Behavioral: The patient does not have insomnia.            PROBLEM LIST/MEDICAL HISTORY      Problem List  Date Reviewed: 1/16/2018          Codes Class Noted    Atrial fibrillation with rapid ventricular response ([facility] ICD-10-CM: I48.91  ICD-9-CM: 427.31  1/16/2018    Overview Signed 1/16/2018 11:21 AM by Leroy Tomlinson MD     [facility] hospital 10/25/17-10/30/17, Cardizem and Eliquis             Pulmonary Mycobacterium avium complex (MAC) infection ([facility] ICD-10-CM: A31.0  ICD-9-CM: 031.0  1/16/2018    Overview Addendum 1/16/2018 11:53 AM by Lindsey Rodney MD     Pulmonary (Dr Almita Valentine) & ID (Dr Sushma Conte)             Gait instability ICD-10-CM: R26.81  ICD-9-CM: 781.2  7/14/2017    Overview Signed 7/14/2017 10:34 AM by Lindsey Rodney MD     Neuro referred to PT at [address] [place] [address] x 2 months Spring 2017             Multiple pulmonary nodules and cavitary lesions ICD-10-CM: R91.8  ICD-9-CM: 793.19  7/14/2017    Overview Signed 7/14/2017  3:42 PM by Lindsey Rodney MD     2016: Joseline Martin: CT/Pet scan: Inflammatory vs atypical infectious vs metastatic dz             ACP (advance care planning) ICD-10-CM: Z71.89  ICD-9-CM: V65.49  6/24/2016    Overview Addendum 7/14/2017  3:37 PM by Lindsey Rodney MD     Initial ACP discussion 2016, pt states she thinks she has an AMD; Honoring Choices folder given 7-2017               Benign essential hypertension ICD-10-CM: I10  ICD-9-CM: 401.1  6/24/2016    Overview Signed 6/24/2016  8:34 AM by Lindsey Rodney MD     Normal 2D echo 1996  Holter in 2008, occasional PAC's/PVC's             Chronic obstructive pulmonary disease ([facility] ICD-10-CM: J44.9  ICD-9-CM: 950  6/24/2016    Overview Addendum 7/14/2017  3:41 PM by Lindsey Rodney MD     On CXR 6-2016; Pulm Dr Leopoldo Bigness declined ICD-10-CM: L90.80  ICD-9-CM: V64.2  12/31/2012    Overview Signed 12/31/2012  9:21 AM by Lindsey Rodney MD     Pt refuses             FHx: AAA ICD-10-CM: [id number]  ICD-9-CM: Tom Geralds  12/31/2012    Overview Signed 12/31/2012  2:19 PM by Lindsey Rodney MD     Patient had AAA US screen 9/2007, no aneurysm but mild atherosclerotic plaque             B Foot Neuropathy  ICD-10-CM: G57.90  ICD-9-CM: 355.8  9/22/2011    Overview Signed 9/22/2011  2:15 PM by Lindsey Rodney MD     Injections per Podiatry Dr Diana Dumont ~ 2008; orthotics Idiopathic peripheral neuropathy ICD-10-CM: G60.9  ICD-9-CM: 356.9  9/22/2011    Overview Signed 9/22/2011  2:19 PM by Diego Cardenas MD     Neuro Eval Dr Rosenthal [facility], [facility] Dr Avery Crain             H/O Gait Ataxia ICD-10-CM: R27.0  ICD-9-CM: 781.3  9/22/2011    Overview Signed 9/22/2011  2:20 PM by Diego Cardenas MD     S/P Neuro eval; prob r/t etoh and peripheral neuropathy             Tobacco abuse ICD-10-CM: Z72.0  ICD-9-CM: 305.1  9/22/2011        ?  of History of heavy alcohol use ICD-10-CM: Y90.105  ICD-9-CM: 305.03  9/22/2011    Overview Signed 9/22/2011  2:27 PM by Diego Cardenas MD     ~0502'D             H/O Idiopathic Microscopic Hematuria ICD-10-CM: R31.29  ICD-9-CM: 599.72  9/22/2011    Overview Signed 9/22/2011  3:11 PM by Diego Cardenas MD     Cystoscopy and eval per Urology Dr Corinna Smyth 2008             H/O Left Breast Calcifications ICD-10-CM: R92.1  ICD-9-CM: 793.89  9/22/2011    Overview Signed 9/22/2011  3:14 PM by Diego Cardenas MD     1997, declined bx per Dr Negro Auguste             S/P Left Humeral Fracture 2005 ICD-10-CM: C97.295G  ICD-9-CM: 812.20  9/22/2011    Overview Signed 9/22/2011  3:15 PM by Diego Cardenas MD     [place], reset; Dr La Godoy             DDD (degenerative disc disease), cervical ICD-10-CM: M50.30  ICD-9-CM: 722.4  9/22/2011        DDD (degenerative disc disease), lumbar ICD-10-CM: M51.36  ICD-9-CM: 722.52  9/22/2011    Overview Signed 9/22/2011  3:15 PM by Diego Cardenas MD     PT at [facility] 2009             Left sided sciatica ICD-10-CM: M54.32  ICD-9-CM: 724.3  9/22/2011    Overview Signed 9/22/2011  3:16 PM by Diego Cardenas MD     PT             Left Cervical Radiculopathy, Cervical DDD, Fusion and Facet Arthropathy ICD-10-CM: M54.12  ICD-9-CM: 723.4  9/22/2011    Overview Addendum 1/16/2018 11:14 AM by Diego Cardenas MD     PT, 2009; Neuro Dr Alcides Hancock 11/2011, Cervical Myelopathy Hyperlipidemia ICD-10-CM: E78.5  ICD-9-CM: 272.4  6/25/2010        Postmenopause, LMP 54yo, No HRT ICD-10-CM: Z78.0  ICD-9-CM: V49.81  6/25/2010        Palpitations ICD-10-CM: R00.2  ICD-9-CM: 785.1  3/16/2010    Overview Signed 3/16/2010  2:05 PM by Ella Sears     ER [id number]             Bilateral cataracts ICD-10-CM: H26.9  ICD-9-CM: 366.9  3/16/2010        Macular degeneration ICD-10-CM: H35.30  ICD-9-CM: 362.50  3/16/2010    Overview Signed 9/22/2011  2:01 PM by MD Dr Chelle Leos             Right hip pain ICD-10-CM: M25.551  ICD-9-CM: 719.45  3/16/2010    Overview Addendum 9/22/2011  3:12 PM by Milan Alfaro MD     Sacroiliac pain; Ortho Dr Elham Huber             OA (osteoarthritis) ICD-10-CM: M19.90  ICD-9-CM: 715.90  3/16/2010    Overview Addendum 9/22/2011  3:12 PM by Milan Alfaro MD     Knee/hip; Dr Carlos Bowman (PMR)             H/O B Lower Extremity Pain & Weakness, R>L ICD-10-CM: M79.606  ICD-9-CM: 729.5  3/16/2010    Overview Addendum 9/22/2011  3:13 PM by Milan Alfaro MD     PMR, Dr Carlos Bowman and Neuro eval                       PAST SURGICAL HISTORY       Past Surgical History:   Procedure Laterality Date    HX CATARACT REMOVAL Bilateral 2011    HX COLONOSCOPY  ~2009    HX GYN  age 25    EAB    HX HIP REPLACEMENT  4/2010    left hip; Dr Le Woodruff [address]  9/2005    no surgery, but had left shoulder/humeral fx after fall-    HX OTHER SURGICAL  07/25/2017    Bronchoscopy and Bronhcial lavage LUIS & LLL: benign inflammatory cells; Dr Melissa Gan  07/25/2017    FNA Left Lung: Benign; Dr Trinity Oakley      Current Outpatient Prescriptions   Medication Sig    apixaban (ELIQUIS) 2.5 mg tablet Take 2.5 mg by mouth two (2) times a day.  aspirin delayed-release 81 mg tablet Take  by mouth daily.  digoxin (LANOXIN) 0.125 mg tablet Take 0.125 mg by mouth daily.  HOLD FOR PULSE<55    potassium chloride (K-DUR, KLOR-CON) 20 mEq tablet Take 20 mEq by mouth daily.  omega-3 fatty acids (FISH OIL CONCENTRATE) 1,000 mg cap Take 2 Caps by mouth daily.  metoprolol tartrate (LOPRESSOR) 25 mg tablet Take 1 Tab by mouth two (2) times a day. Indications: hypertension, palpitations (Patient taking differently: Take 25 mg by mouth two (2) times a day. HOLD FOR SBP=OR<100  Indications: hypertension, palpitations)    atorvastatin (LIPITOR) 10 mg tablet TAKE 1 TABLET BY MOUTH EVERY DAY    buprenorphine (BUTRANS) 5 mcg/hour patch 1 Patch by TransDERmal route every seven (7) days. Per Neurology  Indications: Chronic Pain    traMADol (ULTRAM) 50 mg tablet Take 50 mg by mouth daily as needed for Pain. Per Neuro Dr Jalyn Nicole venlafaxine-SR [facility] 150 mg capsule TAKE ONE CAPSULE BY MOUTH EVERY DAY    Calcium Carbonate-Vit D3-Min (CALTRATE 600+D PLUS MINERALS) 600 mg (1,500 mg)-400 unit Chew Take  by mouth daily.  VIT C/DL-E AC/LUT/COPPER/ZNOX (PRESERVISION PO) Take 1 Tab by mouth daily.  MULTIVITS W-FE,OTHER MIN (CENTRUM PO) Take 1 Tab by mouth daily. No current facility-administered medications for this visit.            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      Retired     Social History Main Topics    Smoking status: Current Every Day Smoker     Packs/day: 0.50     Years: 40.00     Types: Cigarettes    Smokeless tobacco: Never Used      Comment: decreased from ppd to .5 ppd since     Alcohol use No      Comment: used to have 1 glass of red wine qhs but none since hospital     Drug use: No    Sexual activity: No     Other Topics Concern     Service No    Blood Transfusions No    Caffeine Concern No 2-3 cups of decaff coffee a day    Occupational Exposure No    Hobby Hazards No    Sleep Concern No    Stress Concern No    Special Diet No    Back Care No    Exercise No    Bike Helmet No    Seat Belt Yes    Self-Exams Yes     Social History Narrative     since 3/2011,  was Rossy Murphy,  of lung cancer. Lives at home alone now.  comes to clean once a month    She was driving up until recent hospital admission  for weakness    Now her close friend transports her when needed    Does her own meal preparation which is minimal now since her progressive weakness and hospitalization . She makes cold cut sandwiches, toast, soup, eggs. Handles her own medications and finances. Did PT for gait stability  and again spring 2017 x 2 months at [address] from [facility]  she was transferred to [facility]. and dc'd back home on 18 w/ Home Health, PT/OT through [facility]. Closest next of kin is her niece Angeline Valdez, lives in [facility] [place], moving to [address] [place] [address] .     Patient has designated her POA her , Liliya Young        IMMUNIZATIONS  Immunization History   Administered Date(s) Administered    Influenza High Dose Vaccine PF 2017    Influenza Vaccine 2015    Influenza Vaccine Split 2012    Pneumococcal Polysaccharide (PPSV-23) 2016    TB Skin Test (PPD) 10/30/2017, 2017    TB Skin Test (PPD) Intradermal 2016    TD Vaccine 1990    Tdap 2016    Pneumococcal Vaccine  2007         FAMILY HISTORY     Family History   Problem Relation Age of Onset    Cancer Father      skin    Heart Attack Father       in his late [de-identified]    Hypertension Father     Arthritis-osteo Father     Other Father      AAA; pt had negative screen     Stroke Mother       in her late 66's    Hypertension Mother     Arthritis-osteo Mother     Diabetes Mother    Pam Saldivart Sister      breast and lung    Hypertension Sister     Dementia Sister      Alzheimer's in a nursing home in [address] [place] [address] /85 (BP 1 Location: Left arm, BP Patient Position: Sitting)    Pulse 98    Temp 97.5 °F (36.4 °C) (Oral)    Resp 16    Ht 5' 6\" (1.676 m)    Wt 91 lb (41.3 kg)    LMP  (LMP Unknown)    SpO2 96%    BMI 14.69 kg/m2          PHYSICAL EXAMINATION     Physical Exam   Constitutional: She is oriented to person, place, and time. No distress. Thin, frail female   HENT:   OP pink, slightly dry   Eyes: No scleral icterus. Neck: Neck supple. Cardiovascular: Regular rhythm. No murmur heard. Rate 98   Pulmonary/Chest: Effort normal. No respiratory distress. She has no wheezes. Abdominal: Soft. She exhibits no distension. Musculoskeletal: She exhibits no edema or tenderness. Neurological: She is alert and oriented to person, place, and time. Gait is slow but steady using rolling walker w/o assistance from another person   Skin: Skin is warm and dry. Right low back: dime sized non necrotic, dried, eschar formation w/ 1-2 mm rim of erythema. Non tender. No streaking or exudate. No crusting. Psychiatric: Mood and affect normal.   Vitals reviewed.              ASSESSMENT & PLAN   Diagnoses and all orders for this visit:    1. Weakness generalized  -     REFERRAL TO MSS[facility] PROGRAMS    2. Weight loss  -     CBC WITH AUTOMATED DIFF  -     METABOLIC PANEL, COMPREHENSIVE  -     TSH 3RD GENERATION  -     REFERRAL TO MSSP PROGRAMS    3. Malnutrition, unspecified type ([facility]  -     CBC WITH AUTOMATED DIFF  -     METABOLIC PANEL, COMPREHENSIVE  -     VITAMIN D, 25 HYDROXY  -     VITAMIN B12  -     FOLATE  -     REFERRAL TO MSSP PROGRAMS   Was seen and evaluated by nutritionist/dietician during rehab. Patient currently back home and preparing her own meals, small portions, and only eating ~ 2 meals a day.  Will have her add Ensure or Boost 1-2 cans a day. Also discussed trying to get a personal aide/CNA to come to her home a few hours every morning to assist w/ grooming, home chores, meal preparations, shopping needs. Encouraged pt to eat what she likes, high calorie content foods. Discussed meal planning suggestions. She has applied for Meals on Wheels and is waiting for that to get approved. Will see if our NN or SW from [facility] can assist w/ some of this as well. 4. Self-care deficit in patient living alone  -     REFERRAL TO MSSP PROGRAMS/NN/LCSW    5. Atrial fibrillation with rapid ventricular response (HCC)  Assessment & Plan:  Stable, based on history, physical exam and review of pertinent labs, studies and medications; meds reconciled; continue current treatment plan., This condition is managed by Specialist.  Key CAD CHF Meds             apixaban (ELIQUIS) 2.5 mg tablet  (Taking) Take 2.5 mg by mouth two (2) times a day. aspirin delayed-release 81 mg tablet  (Taking) Take  by mouth daily. digoxin (LANOXIN) 0.125 mg tablet  (Taking) Take 0.125 mg by mouth daily. HOLD FOR PULSE<55    metoprolol tartrate (LOPRESSOR) 25 mg tablet  (Taking) Take 1 Tab by mouth two (2) times a day. Indications: hypertension, palpitations        Key Antihyperlipidemia Meds             omega-3 fatty acids (FISH OIL CONCENTRATE) 1,000 mg cap  (Taking) Take 2 Caps by mouth daily. atorvastatin (LIPITOR) 10 mg tablet  (Taking) TAKE 1 TABLET BY MOUTH EVERY DAY        Lab Results   Component Value Date/Time    Sodium 141 07/14/2017 10:55 AM    Potassium 4.4 07/14/2017 10:55 AM    Cholesterol, total 125 07/14/2017 10:55 AM    HDL Cholesterol 48 07/14/2017 10:55 AM    LDL, calculated 57 07/14/2017 10:55 AM    Triglyceride 98 07/14/2017 10:55 AM         6. Pulmonary Mycobacterium avium complex (MAC) infection ([facility]  Assessment & Plan:   This condition is managed by Specialist. Pulmonary (Dr Sasha Cleary) & ID (Dr Keith Angel)  Evaluation includes: Chest Ct's, Bronchoscopy, Quant Gold TB. Long term multiregimen antibiotic therapy recommended. Limitations: Affordability. 7. Chronic obstructive pulmonary disease, unspecified COPD type ([address] no acute/active symptoms  Assessment & Plan: This condition is managed by Specialist. Pulmonary Dr Peyton Messina. [place] COPD Medications     Patient is on no COPD/Asthma meds. IMMUNIZATIONS  Immunization History   Administered Date(s) Administered    Influenza High Dose Vaccine PF 01/03/2017    Influenza Vaccine 03/09/2015    Influenza Vaccine Split 12/31/2012    Pneumococcal Polysaccharide (PPSV-23) 03/07/2016    TB Skin Test (PPD) 10/30/2017, 11/12/2017    TB Skin Test (PPD) Intradermal 07/06/2016    TD Vaccine 06/18/1990    Tdap 06/24/2016    Pneumococcal Vaccine  09/03/2007       8. Multiple pulmonary nodules and cavitary lesions   Followed by Pulmonary and ID    9. Eschar of trunk: Right low back, s/p wound care treatment, Healed   Non necrotic, dry    10. ACP (advance care planning)    Discussion w/ pt and friend today   Referral to Nurse Navigator and  to facilitate    11. Depression, stable. Continue Effexor    Mrs. Francisco is a 77 yo  who was brought in by a close friend today for Transition of Care services following hospital admission to [address] [place] [address] 10/25/17-10/30/17 for progressive weakness and Atrial Fibrillation w/ RVR, then transitioned to [facility] 10/30/17 through 1/4/17. Since being back home and living alone, she has been receiving [facility]. She states the nurse has been coming about once a week and PT/OT coming about 2 x a week each. She is ambulatory using her rolling walker, preparing her own meals (see details in Nutrition Assessment above), and managing her own medications. She is self administering her meds daily all on her own.  Reviewed all medications, effects, indications, dosing, instructions and side effects in detail     Prior to admission she was being followed by ID and Pulmonary. She thinks she may have seen ID 2-3 times since being dc'd (?Dr Delaney Cervantes). There was discussion about placing her on multiregimen of abx long term but patient states it was going to be expensive and she was uncertain if she would be able to afford it and was soliciting her niece (closest next of kin) to assist w/ this. That is where things have left off. She has not had follow up w/ Pulmonary yet but will be scheduling to see Dr Miguel Gomez sometime in the next few days. She was advised to follow up w/ cardiology, but she doesn't know w/ whom or when and does not think she has anything set up for this yet. We have contacted [facility] in an effort to see if they have this information and [facility] to see what cardiologist followed her there. We will try to coordinate setting up f/u for this once we have more information.     Follow up w/ me in ~ 2-3 weeks, sooner prn.

## 2018-01-16 NOTE — PROGRESS NOTES
Katty Valverde is a 78 y.o. female   This patient was received as a referral from from provider, following an OV for a post-rehab discharge from [facility]. NN was able to meet with patient today following PCP visit. NN had patient sign HIPPA form to be able to talk with her friend Zenobia Hay, who help with her care. Patient states she lives alone, but has skilled services in her home by [facility]. Patient has a history of COPD and continue to smoke. Patient states she has loss some weight since being home and was  Told that Meals on Wheels would be set up in her home at discharge from rehab. She is not receiving this service at this time. Admits to willingness to [facility] placement if available. Inpatient RRAT Score: 13  Patient's challenges to self management identified: Ineffective coping, unable to care fore self in current situation. Patient verbalized need of someone being there to help her, but has no close family, only assistance of friend Toshia Seats). NN spoke to representative at [facility] today, who said the patient's  is Kathrine Way. He has been out to visit with her, but will go back out at a time when Loreto Anthony is there to help communicate a plan. Patient is having memory issues and do not remember each visit, or who is coming. NN asked if a medicaid application could be completed and a request for meals on wheels. Patient is in agreement to these services. Message to Medical Home  [address] to call patient and her  for coordination of services to help patient with affording assisted living or skilled care. Referral also sent to Kurt Sharma for an evaluation to [address] Medication Management: Patient admits to understanding medications, often have problems with memory and is not sure if medications were taken.     Summary of patients top three problems:    Problem 1: Weakness- walks with rolator, verbalizes weight loss, and loss of appetite. Problem 2: COPD-patient admits to smoking still. Pulmonary Mycobacterium avium complex (MAC) infection ([address]  Assessment & Plan: This condition is managed by Specialist. Pulmonary (Dr Raven Nance) & ID (Dr Yevgeniy Hartman)  Evaluation includes: Chest Ct's, Bronchoscopy, Quant Gold TB. Long term multiregimen antibiotic therapy recommended. Limitations: Affordability     Problem 3: Self-care deficit in patient living alone- verbalized inability to care for self, lives alone. Referral to Medical home , Home health , [facility] services program.    Patients motivational level on a scale of 0-10: 4  Advance Care Planning:   Patient was offered the opportunity to discuss advance care planning:  yes     Does patient have an Advance Directive:  Not sure   If no, did you provide information on Advance Care Planning? Yes, patient given information about ACP, she is unsure who her agent will be or if she already has ACP, will review and schedule for completion of document, has to contact her POA. Note by Dr. Alisha Richards:  Patient is unsure if she has an AMD. Close friend accompanies her today, but is unfamiliar as well. Her closest next of kin is a niece in [facility] and her POA is her . Initial ACP discussion. AMD form reviewed and copy provided. NN/facilitator to discuss with patient. I introduced patient to [place] in office today. NN will contact pt at home later this week to help facilitate this discussion and plans     . Current Outpatient Prescriptions   Medication Sig    apixaban (ELIQUIS) 2.5 mg tablet Take 2.5 mg by mouth two (2) times a day.  aspirin delayed-release 81 mg tablet Take  by mouth daily.  digoxin (LANOXIN) 0.125 mg tablet Take 0.125 mg by mouth daily. HOLD FOR PULSE<55    potassium chloride (K-DUR, KLOR-CON) 20 mEq tablet Take 20 mEq by mouth daily.     omega-3 fatty acids (FISH OIL CONCENTRATE) 1,000 mg cap Take 2 Caps by mouth daily.  metoprolol tartrate (LOPRESSOR) 25 mg tablet Take 1 Tab by mouth two (2) times a day. Indications: hypertension, palpitations (Patient taking differently: Take 25 mg by mouth two (2) times a day. HOLD FOR SBP=OR<100  Indications: hypertension, palpitations)    atorvastatin (LIPITOR) 10 mg tablet TAKE 1 TABLET BY MOUTH EVERY DAY    buprenorphine (BUTRANS) 5 mcg/hour patch 1 Patch by TransDERmal route every seven (7) days. Per Neurology  Indications: Chronic Pain    traMADol (ULTRAM) 50 mg tablet Take 50 mg by mouth daily as needed for Pain. Per Neuro Dr Heonk Khan venlafaxine-SR [facility] 150 mg capsule TAKE ONE CAPSULE BY MOUTH EVERY DAY    Calcium Carbonate-Vit D3-Min (CALTRATE 600+D PLUS MINERALS) 600 mg (1,500 mg)-400 unit Chew Take  by mouth daily.  VIT C/DL-E AC/LUT/COPPER/ZNOX (PRESERVISION PO) Take 1 Tab by mouth daily.  MULTIVITS W-FE,OTHER MIN (CENTRUM PO) Take 1 Tab by mouth daily. No current facility-administered medications for this visit. [employer], Referrals, and Durable Medical Equipment: Rollator, cane, walker    Follow up appointments: follow-up in 2-3 weeks with PCP  Patient verbalized understanding of all information discussed. Patient has this Nurse Navigators contact information for any further questions, concerns, or needs.

## 2018-01-16 NOTE — ASSESSMENT & PLAN NOTE
This condition is managed by Specialist.  Key COPD Medications     Patient is on no COPD/Asthma meds.         Lab Results   Component Value Date/Time    WBC 8.6 07/14/2017 10:55 AM    HGB 12.3 07/14/2017 10:55 AM    HCT 38.9 07/14/2017 10:55 AM    PLATELET 113 16/50/7175 10:55 AM

## 2018-01-17 ENCOUNTER — TELEPHONE (OUTPATIENT)
Dept: FAMILY MEDICINE CLINIC | Age: 78
End: 2018-01-17

## 2018-01-17 LAB
25(OH)D3+25(OH)D2 SERPL-MCNC: 53.1 NG/ML (ref 30–100)
ALBUMIN SERPL-MCNC: 4 G/DL (ref 3.5–4.8)
ALBUMIN/GLOB SERPL: 1.3 {RATIO} (ref 1.2–2.2)
ALP SERPL-CCNC: 96 IU/L (ref 39–117)
ALT SERPL-CCNC: 29 IU/L (ref 0–32)
AST SERPL-CCNC: 35 IU/L (ref 0–40)
BASOPHILS # BLD AUTO: 0 X10E3/UL (ref 0–0.2)
BASOPHILS NFR BLD AUTO: 0 %
BILIRUB SERPL-MCNC: 0.4 MG/DL (ref 0–1.2)
BUN SERPL-MCNC: 22 MG/DL (ref 8–27)
BUN/CREAT SERPL: 27 (ref 12–28)
CALCIUM SERPL-MCNC: 9.4 MG/DL (ref 8.7–10.3)
CHLORIDE SERPL-SCNC: 94 MMOL/L (ref 96–106)
CO2 SERPL-SCNC: 22 MMOL/L (ref 18–29)
CREAT SERPL-MCNC: 0.81 MG/DL (ref 0.57–1)
EOSINOPHIL # BLD AUTO: 0 X10E3/UL (ref 0–0.4)
EOSINOPHIL NFR BLD AUTO: 0 %
ERYTHROCYTE [DISTWIDTH] IN BLOOD BY AUTOMATED COUNT: 15.5 % (ref 12.3–15.4)
FOLATE SERPL-MCNC: 14.5 NG/ML
GLOBULIN SER CALC-MCNC: 3.1 G/DL (ref 1.5–4.5)
GLUCOSE SERPL-MCNC: 93 MG/DL (ref 65–99)
HCT VFR BLD AUTO: 40.5 % (ref 34–46.6)
HGB BLD-MCNC: 12.4 G/DL (ref 11.1–15.9)
IMM GRANULOCYTES # BLD: 0 X10E3/UL (ref 0–0.1)
IMM GRANULOCYTES NFR BLD: 0 %
LYMPHOCYTES # BLD AUTO: 1.1 X10E3/UL (ref 0.7–3.1)
LYMPHOCYTES NFR BLD AUTO: 9 %
MCH RBC QN AUTO: 25.5 PG (ref 26.6–33)
MCHC RBC AUTO-ENTMCNC: 30.6 G/DL (ref 31.5–35.7)
MCV RBC AUTO: 83 FL (ref 79–97)
MONOCYTES # BLD AUTO: 0.9 X10E3/UL (ref 0.1–0.9)
MONOCYTES NFR BLD AUTO: 7 %
NEUTROPHILS # BLD AUTO: 9.8 X10E3/UL (ref 1.4–7)
NEUTROPHILS NFR BLD AUTO: 84 %
PLATELET # BLD AUTO: 365 X10E3/UL (ref 150–379)
POTASSIUM SERPL-SCNC: 4.6 MMOL/L (ref 3.5–5.2)
PROT SERPL-MCNC: 7.1 G/DL (ref 6–8.5)
RBC # BLD AUTO: 4.87 X10E6/UL (ref 3.77–5.28)
SODIUM SERPL-SCNC: 139 MMOL/L (ref 134–144)
TSH SERPL DL<=0.005 MIU/L-ACNC: 1.55 UIU/ML (ref 0.45–4.5)
VIT B12 SERPL-MCNC: 1284 PG/ML (ref 232–1245)
WBC # BLD AUTO: 11.8 X10E3/UL (ref 3.4–10.8)

## 2018-01-17 NOTE — ASSESSMENT & PLAN NOTE
This condition is managed by Specialist. Pulmonary (Dr Frances Crum) & ID (Dr Isaac Real)    Lab Results   Component Value Date/Time    WBC 8.6 07/14/2017 10:55 AM    HGB 12.3 07/14/2017 10:55 AM    HCT 38.9 07/14/2017 10:55 AM    PLATELET 350 61/30/5186 10:55 AM    Creatinine 0.78 07/14/2017 10:55 AM    BUN 22 07/14/2017 10:55 AM   Evaluation includes: Chest Ct's, Bronchoscopy, Quant Gold TB. Long term multiregimen antibiotic therapy recommended. Limitations: Affordability.

## 2018-01-17 NOTE — ASSESSMENT & PLAN NOTE
Stable, based on history, physical exam and review of pertinent labs, studies and medications; meds reconciled; continue current treatment plan., This condition is managed by Specialist.  Key CAD CHF Meds             apixaban (ELIQUIS) 2.5 mg tablet  (Taking) Take 2.5 mg by mouth two (2) times a day. aspirin delayed-release 81 mg tablet  (Taking) Take  by mouth daily. digoxin (LANOXIN) 0.125 mg tablet  (Taking) Take 0.125 mg by mouth daily. HOLD FOR PULSE<55    metoprolol tartrate (LOPRESSOR) 25 mg tablet  (Taking) Take 1 Tab by mouth two (2) times a day. Indications: hypertension, palpitations        Key Antihyperlipidemia Meds             omega-3 fatty acids (FISH OIL CONCENTRATE) 1,000 mg cap  (Taking) Take 2 Caps by mouth daily.     atorvastatin (LIPITOR) 10 mg tablet  (Taking) TAKE 1 TABLET BY MOUTH EVERY DAY        Lab Results   Component Value Date/Time    Sodium 141 07/14/2017 10:55 AM    Potassium 4.4 07/14/2017 10:55 AM    Cholesterol, total 125 07/14/2017 10:55 AM    HDL Cholesterol 48 07/14/2017 10:55 AM    LDL, calculated 57 07/14/2017 10:55 AM    Triglyceride 98 07/14/2017 10:55 AM

## 2018-01-17 NOTE — PATIENT INSTRUCTIONS
Advance Directives: Care Instructions  Your Care Instructions  An advance directive is a legal way to state your wishes at the end of your life. It tells your family and your doctor what to do if you can no longer say what you want. There are two main types of advance directives. You can change them any time that your wishes change. · A living will tells your family and your doctor your wishes about life support and other treatment. · A durable power of  for health care lets you name a person to make treatment decisions for you when you can't speak for yourself. This person is called a health care agent. If you do not have an advance directive, decisions about your medical care may be made by a doctor or a  who doesn't know you. It may help to think of an advance directive as a gift to the people who care for you. If you have one, they won't have to make tough decisions by themselves. Follow-up care is a key part of your treatment and safety. Be sure to make and go to all appointments, and call your doctor if you are having problems. It's also a good idea to know your test results and keep a list of the medicines you take. How can you care for yourself at home? · Discuss your wishes with your loved ones and your doctor. This way, there are no surprises. · Many states have a unique form. Or you might use a universal form that has been approved by many states. This kind of form can sometimes be completed and stored online. Your electronic copy will then be available wherever you have a connection to the Internet. In most cases, doctors will respect your wishes even if you have a form from a different state. · You don't need a  to do an advance directive. But you may want to get legal advice. · Think about these questions when you prepare an advance directive:  ¨ Who do you want to make decisions about your medical care if you are not able to?  Many people choose a family member or close friend. ¨ Do you know enough about life support methods that might be used? If not, talk to your doctor so you understand. ¨ What are you most afraid of that might happen? You might be afraid of having pain, losing your independence, or being kept alive by machines. ¨ Where would you prefer to die? Choices include your home, a hospital, or a nursing home. ¨ Would you like to have information about hospice care to support you and your family? ¨ Do you want to donate organs when you die? ¨ Do you want certain Yazidi practices performed before you die? If so, put your wishes in the advance directive. · Read your advance directive every year, and make changes as needed. When should you call for help? Be sure to contact your doctor if you have any questions. Where can you learn more? Go to http://elizabeth-fab.info/. Enter R264 in the search box to learn more about \"Advance Directives: Care Instructions. \"  Current as of: September 24, 2016  Content Version: 11.4  © 7496-4759 Healthwise, Incorporated. Care instructions adapted under license by [employer] (which disclaims liability or warranty for this information). If you have questions about a medical condition or this instruction, always ask your healthcare professional. [address] any warranty or liability for your use of this information.

## 2018-01-18 NOTE — TELEPHONE ENCOUNTER
----- Message from Marcus Gardner RN sent at 1/17/2018  9:44 AM EST -----  Regarding: Referral for Assisted Living Facility/Palliative with [address]  Patient admits to needing a higher level of care. States she is no longer able to care for self in home. Has no immediate family members, only a friend Kay Macias. Patient recently discharged for Failure to Thrive, weakness, and states she loss more than 30 pounds from August to admission in October. Friend discovered her in house, was very weak and unable care for self. Admission BPs were very low. [facility] she was in rehab at [facility] from 10/17-1/18. Discharged to home on 1/4/18- alone. Unable to drive, or care for self. Patient saw Dr Dot Serrano at [facility].

## 2018-01-20 ENCOUNTER — TELEPHONE (OUTPATIENT)
Dept: FAMILY MEDICINE CLINIC | Age: 78
End: 2018-01-20

## 2018-01-20 NOTE — TELEPHONE ENCOUNTER
Labs all ok except WBC slightly elevated (r/t acutely ill/infection/stress). Does she have her f/u appts set w/ ID and Pulm yet? Any update on her social situation/help at home or moving into a facility? Follow up w/ me in 2-3 weeks, sooner prn.

## 2018-01-22 ENCOUNTER — TELEPHONE (OUTPATIENT)
Dept: FAMILY MEDICINE CLINIC | Age: 78
End: 2018-01-22

## 2018-01-22 NOTE — TELEPHONE ENCOUNTER
Inbound call from Sandy Blackman (listed on HIPAA form dated 18). Pt identifiers ( & address) verified per HIPAA policy. Identified self and role, Ms. Apolonia Mendez acknowledged understanding. Ms. Apolonia Mendez, spoke to patient and obtained the POA information and provided to clinician. Ludin Donohue [phone]. Plan:    -clinician will reach out to POA to discuss medical staff concerns.       SAM Yates

## 2018-01-22 NOTE — TELEPHONE ENCOUNTER
3:58 pm, Outbound call to Maurisio Brewster (listed on HIPAA form dated 18). Identified self, role and nature of the call, Ms. Carlo Phillips acknowledged understanding. Pt identifiers ( & address) verified per HIPAA policy. Elio Rae of the call re: pt currently lives alone. Inquired the following questions to Ms. Carlo Phillips regarding the patient: A) if patient has applied for Medicaid to determine eligibility B) financial situation C) information (name/phone # to POA). Ms. Carlo Phillips voiced the following information to clinician:    -pt has Medicare only. Unsure if a Medicaid application has ever been done.    -doesn't know what the patient's financial situation is.    -doesn't know who the name and phone # patient's  (POA). -doesn't feel comfortable making any decisions (legal) on behalf of the patient-you'll need to speak w/her . Writer acknowledged understanding. Ms. Carlo Phillips will attempt to call patient to get the name/phone number for . Writer provided direct contact information to Ms. Carlo Phillips. Plan of Care:    -Will contact [facility], as [facility]. Will consult w/HH . SAM Overton        3:57 pm, Outbound call to patient, no answer will try to reach again.     SAM Overton

## 2018-01-23 ENCOUNTER — TELEPHONE (OUTPATIENT)
Dept: FAMILY MEDICINE CLINIC | Age: 78
End: 2018-01-23

## 2018-01-23 ENCOUNTER — PATIENT OUTREACH (OUTPATIENT)
Dept: FAMILY MEDICINE CLINIC | Age: 78
End: 2018-01-23

## 2018-01-23 NOTE — PROGRESS NOTES
NN read note by  (JASON) stating the NN would help with transportation for appointment that had been scheduled by her. Outbound call to patient verified by 3 identifiers. Patient stated she was informed about the appointments but they had not been verified with anyone to provide transportation. NN asked patient if Xiomara Grande (friend that usually help with transporting her) had been notified? Patient replied she had not notified her and was not sure she could provide all the services needed. Call placed to Xiomara Grande, who verified that she would try to take patient to appointments, but because she has illness in her family, she would communicate with the patient and providers if any appointments had to be re-scheduled. Xiomara Harjinder requested that a call be made to her if other appointment are needed so she can make sure she has nothing scheduled at the appointment times and can confirm it is something she can do. NN communicated this request with the patient, and . Patient and transporter verbalized understanding of plan. Please Reference Note received from Jad Wheeler () on progress of patient being placed in [facility]:    Jad Wheeler V    18 9:28 AM   Note      09:22 am, Outbound call to patient, identifiers ( & address) verified per HIPAA policy. Identified self, role and nature of the call, pt acknowledged understanding.       Nature of the call re: POA      Patient voiced \"Mr. Michelle Alvarez is named in my will as the POA, and I have a copy here at the house w/me\". voiced Mr. Michelle Alvarez will be calling her sometime today to discuss POA. \"I want Ralph to assist me w/the process of going into Assisted Living\". Acknowledged understanding.       Inquired if pt has ever applied for Medicaid \"no, don't think I'm going to be eligible\". Engaged pt further regarding financial resources.   Pt voiced she gets $1800/month in [employer], 0 longterm funds, has financial investments, approx $800/mo in rental property and approx $600/mo from  's insurance. House is paid for, and doesn't wish to sell her home.       Total yearly income from [address] is $21,600. Total yearly income from rental property is $9,600. Total yearly income from insurance is $ [address] total for financial resources given is $38,400/yearly basis. Pt is over the income requirement for Medicaid.       Pt requested writer to look into [facility] re: costs. Writer verbally agreed to do so.        Plan:      -writer will contact [facility] re: costs.  -awaiting phone call from Bobbi Mr. Maddy AzulSAM           09:16 am, Outbound call to [employer]. Identified self, role and nature of the call. Pt identifiers verified per HIPAA policy.       Nature of the call re: POA for patient.       Mr. Allison Chacon voiced the following to clinician:     Hasn't spoken to her x 3 years. Paperwork was done prior to patient's spouse passing away. Will check his files in the office later today. Unsure/unaware if he's the POA to patient. Clinician provided direct contact information and requested a return call after he checks his files.          SAM Jefferson        Case management Plan:  NN will continue to attempt contacts with patient by telephone or during office visit within next 7-10 days. Will continue to follow as necessary for the remaining 30 days post visit and will reassess to see if CCM assessment is needed before discharge.

## 2018-01-23 NOTE — TELEPHONE ENCOUNTER
I called pt and went over results. She didn't have any appts set. I scheduled her with Dr Arielle Duran 1/26 @ 10 and she is to arrive @ 9:45 (phone [phone]). Dr Obi Puri 2/1 @ 9:00 arrive 15 min before. (phone [phone]). I scheduled her with Dr Semaj Rivera for 2/8 @ 10:30. She isn't sure about transportation. Advised that I will check with one of the NN to see if they can help with that. She states that someone has talked to her about going to a facility but she doesn't know anything yet. I called [facility] [address] and spoke to [facility] [phone]. He did go out for a visit. He agrees pt needs to be in [facility]. He gave Rochell Babinski (friend) and pt a list of [facility] around them to go visit. I told him that I have made some appts for specialist and that the pt wasn't sure how she would get to them, let alone try to get out to [facility].

## 2018-01-23 NOTE — TELEPHONE ENCOUNTER
Inbound F/U call from Laurie Christopher;  & POA to patient. Pt identifiers ( & address) verified per HIPAA policy. Mr. Michelle Alvarez verbalized the following to clinician:    -looked through files and found paperwork naming him the POA, only in case of Emergencies or if she's incapacitated. -spoke to patient today and will meet w/her this evening to discuss doing another type of POA (general/medical) and to develop a plan regarding finances and placement.    -there are no adult children; and  Laurie Christopher) was named as POA to give patient/ spouse a level of security.      -thinks patient's  spouse was a [address], will look into this for patient. -POA will contact writer w/plan and updated status. POA did ask clinician the following questions:     A) what is the PCP's main concern?: Progressive weakness, decreased ability to care for her self in the home, safety concerns w/ falling, poor nutrition    B) what level of care is the PCP recommending regarding placement? SW did assessment w/ pt and at the home and felt pt needed placement in at least an Asst Living facility. She needs help w/ meals, medication management, housekeeping, transportation, and ongoing therapy for strength building. C) has she been declared incompetent? I am not qualified to officially declare her \"mentally  Incompetent\" that a psychiatrist or neuropsych would be able to, but I dont feel that she is PHYSICALLY capable of self care which poses a risk of progressive poor nutrition, dehydration, worsening weakness and falling. She was able to answer some questions for me at her appt but I can tell she is going to need assistance w/ managing her medications. You may also refer to details in my office note if needed. Clinician explained the following questions will be forwarded to PCP for her input, then answers will be relayed back to POA. POA acknowledged understanding. Plan:    -Gurinder Gomez will contact POA w/answers to the above questions asked. -writer will contact [facility] re: costs/placement. -[facility] (Jerri Snell) will contact writer after meeting w/patient.       SAM Mcdowell

## 2018-01-23 NOTE — TELEPHONE ENCOUNTER
09:22 am, Outbound call to patient, identifiers ( & address) verified per HIPAA policy. Identified self, role and nature of the call, pt acknowledged understanding. Juan Yeimy of the call re: POA     Patient voiced \"Mr. Cherelle Arroyo is named in my will as the POA, and I have a copy here at the house w/me\". voiced Mr. Cherelle Arroyo will be calling her sometime today to discuss POA. \"I want Ralph to assist me w/the process of going into Assisted Living\". Acknowledged understanding. Inquired if pt has ever applied for Medicaid \"no, don't think I'm going to be eligible\". Engaged pt further regarding financial resources. Pt voiced she gets $1800/month in [employer], 0 penitentiary funds, has financial investments, approx $800/mo in rental property and approx $600/mo from  [employer]. House is paid for, and doesn't wish to sell her home. Total yearly income from [employer] is $21,600. Total yearly income from rental property is $9,600. Total yearly income from insurance is $ 7200. Grand total for financial resources given is $38,400/yearly basis. Pt is over the income requirement for Medicaid. Pt requested writer to look into [facility] re: costs. Writer verbally agreed to do so. Plan:     -writer will contact [facility] re: costs.  -awaiting phone call from [facility] Mr. Oralia Ambrocio. SAM Taylor        09:16 am, Outbound call to [employer]. Identified self, role and nature of the call. Pt identifiers verified per HIPAA policy. Juan Gaffney of the call re: POA for patient. Mr. Cherelle Arroyo voiced the following to clinician:    Hasn't spoken to her x 3 years. Paperwork was done prior to patient's spouse passing away. Will check his files in the office later today. Unsure/unaware if he's the POA to patient. Clinician provided direct contact information and requested a return call after he checks his files.         Pro Lopes, MSW

## 2018-01-24 ENCOUNTER — TELEPHONE (OUTPATIENT)
Dept: FAMILY MEDICINE CLINIC | Age: 78
End: 2018-01-24

## 2018-01-24 NOTE — TELEPHONE ENCOUNTER
Outbound call to [facility] re: costs/placement. Voice message left for Healthcare  w/contact information requesting a return call.     SAM Noel Sa

## 2018-01-24 NOTE — TELEPHONE ENCOUNTER
Spoke to Catherine. She said that she did talk to [facility] and they are going to work on getting her in long term care. She will keep us posted.

## 2018-01-24 NOTE — TELEPHONE ENCOUNTER
Inbound call from [facility] (Kaylee Ramírez). POA informed clinician of the following:    Met w/patient last night and was shocked at her condition. My wife is a nurse, and I had her to come over. BP and Pulse Ox started out fine; but then rapidly got worse. Complained of pain under R rib, states she hadn't fallen. She wasn't herself, and was having a little bit of altered mental status. We took her to the [facility], and she was admitted. Writer provided the answers to his questions from PCP. Acknowledged understanding. Voiced he will keep clinician informed of status; and assist her w/getting properly placed in shelter.     SAM Gomez

## 2018-01-24 NOTE — TELEPHONE ENCOUNTER
----- Message from Trice Vicente sent at 1/24/2018  2:00 PM EST -----  Regarding: Dr. Lord Gaspar, a family friend, called to notify Dr. Nancy Snow or her nurse that the patient was admitted to [facility] on last night for heart related issues. Best contact number for Mrs. Luz Maria Rubio is [phone].

## 2018-01-25 ENCOUNTER — TELEPHONE (OUTPATIENT)
Dept: FAMILY MEDICINE CLINIC | Age: 78
End: 2018-01-25

## 2018-01-25 NOTE — TELEPHONE ENCOUNTER
09:43 am, Outbound F/U call to A re: status of patient who is currently admitted into [address] [place] [address] information left requesting a return phone call. SAM Lynn      09:09 am, Inbound call and voice message left by [facility] (Admissions Coordinator @ [facility]). Message states [facility] doesn't offer LTC placement; they only offer short term rehab for patients' then are returned to their home\".      SAM Lynn

## 2018-01-29 ENCOUNTER — TELEPHONE (OUTPATIENT)
Dept: FAMILY MEDICINE CLINIC | Age: 78
End: 2018-01-29

## 2018-01-29 NOTE — TELEPHONE ENCOUNTER
11: 03 am, Outbound F/U call to [employer]; POA). Voice message left requesting a return phone call w/contact information provided. SAM Valdez      11:01 am, Outbound F/U call to Jr Beatty (listed on HIPAA form dated 18). Pt identifiers ( & address) verified per HIPAA policy. Identified self, role and nature of the call. Ms. Nadiya Deanon acknowledged understanding. Ms. Nadiya Hester informed [facility] \"pt is scheduled to have surgery either today or tomorrow. She has an Aneurysm\". Writer acknowledged understanding.       SAM Valdez

## 2018-01-30 ENCOUNTER — TELEPHONE (OUTPATIENT)
Dept: FAMILY MEDICINE CLINIC | Age: 78
End: 2018-01-30

## 2018-01-30 NOTE — TELEPHONE ENCOUNTER
Inbound call from [facility] (Dahlia Chris; ). Pt identifies ( & address verified per HIPAA policy)  Identified self and role POA acknowledged understanding. POA reports the following information:    -has an Aortic Aneurysm & Pelvic Cyst and is in a lot of pain. Stress test ran today to determine if she can handle surgery-awaiting the results. Surgery hasn't been done, currently in a holding pattern. OB/GYN has been consulted and have discussed the Pelvic Cyst.  If patient elects to have the surgery (possibly later this week) for Aortic Aneurysm; OB/GYN will also be in the surgery to explore cyst.  If she opts not to do the surgery, physicians have consulted w/Palliative Care.      -continuing to work w/patient re: financial planning and decisions regarding options upon d/c from the hospital.  Discharge isn't any time soon.      -[employer] family has been going to visit her since admission. -provided personal cellphone [phone]. Agreed to keep clinician updated on patient's status. Writer verbalized this information will be relayed to PCP.       SAM Morrison

## 2018-02-05 ENCOUNTER — TELEPHONE (OUTPATIENT)
Dept: FAMILY MEDICINE CLINIC | Age: 78
End: 2018-02-05

## 2018-02-05 NOTE — TELEPHONE ENCOUNTER
Outbound F/U call to NAHOMI Galeana; Luigi. Pt identifiers ( & address) verified per HIPAA policy. Identified self, role and nature of the call, POA acknowledged understanding. POA reports the following:    -pt still admitted @ [address] to be scheduled for Aneurysm surgery sometime this week. She passed all of the stress test given to her.    -speaks w/pt daily; and right now pt is making her own decisions. -POA to fax a copy of POA to writer. Provided e-mail address to writer to send fax #. Writer will e-mail POA the fax #; then provide to PCP to review, sign and have scanned in the chart.       SAM Potts

## 2018-02-05 NOTE — TELEPHONE ENCOUNTER
Clinician received e-mail from [facility] stating the following the following:     I will fax you a copy of Veronica's AMD when I get home. I just got off of the phone with Veronica's vascular surgeon. Her anorism surgery is scheduled for noon tomorrow at [facility] on [address] will forward message to PCP.       SAM Gomez

## 2018-02-06 ENCOUNTER — DOCUMENTATION ONLY (OUTPATIENT)
Dept: FAMILY MEDICINE CLINIC | Age: 78
End: 2018-02-06

## 2018-02-06 NOTE — PROGRESS NOTES
Clinician received fax of [employer] Directive from [facility]; /POA on behalf of Jos Lala sent an e-mail confirming receipt of ADM from [address] [place] [address] ([facility]). Clinician personally handed the AMD to [address] [place] [address] for Dr. Sacha Ma to review/sign and get scanned into the chart.       SAM Contreras

## 2018-02-07 ENCOUNTER — TELEPHONE (OUTPATIENT)
Dept: FAMILY MEDICINE CLINIC | Age: 78
End: 2018-02-07

## 2018-02-07 NOTE — TELEPHONE ENCOUNTER
Clinician brina'd email notification from [employer] on 2/6/18 @ 6:36 pm.  Message re: update status of surgery. I just spoke to Dr. Clark Seen, the vascular surgeon. Veronica's aneurysm surgery went well. They also removed an ovary that was swollen. They felt like the swollen ovary was causing much of her discomfort. Dr. Clark Seen was not too concerned about it, but the ovary was sent to pathology just to be on the safe side. She will be sent up to ICU on left on the ventilator until tomorrow. The doctor was pleased with how well she tolerated the surgery.  Praise SAM Mills

## 2018-02-08 ENCOUNTER — DOCUMENTATION ONLY (OUTPATIENT)
Dept: FAMILY MEDICINE CLINIC | Age: 78
End: 2018-02-08

## 2018-02-08 NOTE — ACP (ADVANCE CARE PLANNING)
Initial ACP discussion 2016, pt states she thinks she has an AMD; DADA Crisostomo given 7-2017; received copy of AMD form patient's Tanda Deep on 2-8-18, copy filed to scan to EMR

## 2018-02-08 NOTE — PROGRESS NOTES
ACP update:    Initial ACP discussion 2016, pt states she thinks she has an AMD; DADA Crisostomo given 7-2017; received copy of AMD form patient's POA, Mohini Pollard on 2-8-18, copy filed to scan to EMR

## 2018-02-12 ENCOUNTER — TELEPHONE (OUTPATIENT)
Dept: FAMILY MEDICINE CLINIC | Age: 78
End: 2018-02-12

## 2018-02-12 NOTE — TELEPHONE ENCOUNTER
Outbound F/U call to NAHOMI Nahomi Layer), pt identifiers ( & address) verified per HIPAA policy. Identified self, role and nature of the call, POA acknowledged understanding. POA reports the current status for post Aneurysm surgery on 18. Still in surgical ICU @ [facility]. Docs have been trying to extubate x 4 days now. She's alert; however her pulse rate is 160 which, docs said isn't safe to take her off the ventilator. She's having an A-Fib reaction when trying to breathe on her own. Doc will try to extubate again today. Acknowledged understanding; and explained this information will be relayed to PCP. POA acknowledged understanding. POA verbally agreed to keep clinician updated w/patient's status.     SAM Jamison

## 2018-02-15 ENCOUNTER — TELEPHONE (OUTPATIENT)
Dept: FAMILY MEDICINE CLINIC | Age: 78
End: 2018-02-15

## 2018-03-06 ENCOUNTER — TELEPHONE (OUTPATIENT)
Dept: FAMILY MEDICINE CLINIC | Age: 78
End: 2018-03-06

## 2018-03-06 NOTE — TELEPHONE ENCOUNTER
Outbound call to POA [employer]). Pt identifiers (name & ) verified per HIPAA policy. Identified self, role and nature of the call, POA acknowledged understanding. Clinician inquired how patient is doing and POA reports:    -still in [facility], and still in Surgical ICU.    -mentally she's doing fine; however physically she's declining. Last week she's taken a turn for the worse. -Pneumonia in one of her lungs.  -70% oxygen level  -meeting w/docs yesterday, again today and Fan Zuluaga has to decide if she wants to continue on the ventilator. POA verbally agreed to notify writer of patient's status and decision.       SAM Jefferson

## 2018-03-07 ENCOUNTER — TELEPHONE (OUTPATIENT)
Dept: FAMILY MEDICINE CLINIC | Age: 78
End: 2018-03-07

## 2018-03-07 NOTE — TELEPHONE ENCOUNTER
Inbound call and voice message from [facility] (Randy Steward). Voice message states:    Dylan Matamoros passed away last night. She made the decision to terminate the vent on her own. Thank you so much for all you've done; and please let the doctor know. Again Thank you and God bless\". Writer will inform PCP of this message.       SAM Jefferson
